# Patient Record
Sex: MALE | Race: BLACK OR AFRICAN AMERICAN | NOT HISPANIC OR LATINO | URBAN - METROPOLITAN AREA
[De-identification: names, ages, dates, MRNs, and addresses within clinical notes are randomized per-mention and may not be internally consistent; named-entity substitution may affect disease eponyms.]

---

## 2017-07-19 ENCOUNTER — EMERGENCY (EMERGENCY)
Facility: HOSPITAL | Age: 62
LOS: 0 days | Discharge: HOME | End: 2017-07-20
Attending: EMERGENCY MEDICINE
Payer: MEDICAID

## 2017-07-19 VITALS
OXYGEN SATURATION: 99 % | HEART RATE: 76 BPM | TEMPERATURE: 98 F | HEIGHT: 66 IN | SYSTOLIC BLOOD PRESSURE: 125 MMHG | RESPIRATION RATE: 18 BRPM | DIASTOLIC BLOOD PRESSURE: 88 MMHG | WEIGHT: 160.06 LBS

## 2017-07-19 PROCEDURE — 99284 EMERGENCY DEPT VISIT MOD MDM: CPT | Mod: 25

## 2017-07-19 RX ORDER — IPRATROPIUM/ALBUTEROL SULFATE 18-103MCG
3 AEROSOL WITH ADAPTER (GRAM) INHALATION ONCE
Refills: 0 | Status: COMPLETED | OUTPATIENT
Start: 2017-07-19 | End: 2017-07-19

## 2017-07-19 NOTE — ED PROVIDER NOTE - OBJECTIVE STATEMENT
Pertinent PMH/PSH/FHx/SHx and Review of Systems contained within:  Patient presents to the ED for cough.  Patient is nonverbal for MR, accompanied by staff member.  Patient has been having a cough for a few days, started on cipro 500 BID today for atypical pneumonia.  Sent in by resident nurse for CXR because "he was breathing funny."  Patient appears well, no respiratory distress.     Relevant PMHx/SHx/SOCHx/FAMH:  MR, VIt D deficiency, constipation  Residence is [place]

## 2017-07-19 NOTE — ED PROVIDER NOTE - MEDICAL DECISION MAKING DETAILS
Patient with cough, irregular breathing at home.  VSS. Labs and CXR reviewed.  Possible blunting of right cardiac silhoutte suggestive of pneumonia.  Patient well appearing and nonseptic.  Already on antibiotics, will have him continue, additional nebs and tessilon prescribed.  Recommend follow up with PMD to staff, can return to ER for any acute worsening, but for now appears too well for inpatient treatment of his cough.

## 2017-07-19 NOTE — ED ADULT NURSE NOTE - OBJECTIVE STATEMENT
Pt is a  alert 61 YOM who is here for a cough that has been going on for about a week and unproductive. Pt is coughing, but it is non-productive. Pt has been treated for this about a month ago and they diagnosed him bronchitis. That dissipated and the pt returned to normal. Pt is now here for the recurring cough.

## 2017-07-20 VITALS
OXYGEN SATURATION: 96 % | HEART RATE: 75 BPM | DIASTOLIC BLOOD PRESSURE: 66 MMHG | RESPIRATION RATE: 19 BRPM | TEMPERATURE: 98 F | SYSTOLIC BLOOD PRESSURE: 140 MMHG

## 2017-07-20 LAB
ALBUMIN SERPL ELPH-MCNC: 3 G/DL — LOW (ref 3.3–5)
ALP SERPL-CCNC: 82 U/L — SIGNIFICANT CHANGE UP (ref 40–120)
ALT FLD-CCNC: 27 U/L — SIGNIFICANT CHANGE UP (ref 12–78)
ANION GAP SERPL CALC-SCNC: 9 MMOL/L — SIGNIFICANT CHANGE UP (ref 5–17)
AST SERPL-CCNC: 38 U/L — HIGH (ref 15–37)
BASOPHILS # BLD AUTO: 0.1 K/UL — SIGNIFICANT CHANGE UP (ref 0–0.2)
BASOPHILS NFR BLD AUTO: 1.2 % — SIGNIFICANT CHANGE UP (ref 0–2)
BILIRUB SERPL-MCNC: 0.5 MG/DL — SIGNIFICANT CHANGE UP (ref 0.2–1.2)
BUN SERPL-MCNC: 14 MG/DL — SIGNIFICANT CHANGE UP (ref 7–23)
CALCIUM SERPL-MCNC: 8 MG/DL — LOW (ref 8.5–10.1)
CHLORIDE SERPL-SCNC: 106 MMOL/L — SIGNIFICANT CHANGE UP (ref 96–108)
CO2 SERPL-SCNC: 26 MMOL/L — SIGNIFICANT CHANGE UP (ref 22–31)
CREAT SERPL-MCNC: 1.06 MG/DL — SIGNIFICANT CHANGE UP (ref 0.5–1.3)
EOSINOPHIL # BLD AUTO: 0.4 K/UL — SIGNIFICANT CHANGE UP (ref 0–0.5)
EOSINOPHIL NFR BLD AUTO: 6.2 % — HIGH (ref 0–6)
GLUCOSE SERPL-MCNC: 103 MG/DL — HIGH (ref 70–99)
HCT VFR BLD CALC: 36.2 % — LOW (ref 39–50)
HGB BLD-MCNC: 12.7 G/DL — LOW (ref 13–17)
LYMPHOCYTES # BLD AUTO: 1.2 K/UL — SIGNIFICANT CHANGE UP (ref 1–3.3)
LYMPHOCYTES # BLD AUTO: 18.6 % — SIGNIFICANT CHANGE UP (ref 13–44)
MCHC RBC-ENTMCNC: 29 PG — SIGNIFICANT CHANGE UP (ref 27–34)
MCHC RBC-ENTMCNC: 35 GM/DL — SIGNIFICANT CHANGE UP (ref 32–36)
MCV RBC AUTO: 82.8 FL — SIGNIFICANT CHANGE UP (ref 80–100)
MONOCYTES # BLD AUTO: 0.9 K/UL — SIGNIFICANT CHANGE UP (ref 0–0.9)
MONOCYTES NFR BLD AUTO: 14.1 % — HIGH (ref 2–14)
NEUTROPHILS # BLD AUTO: 3.7 K/UL — SIGNIFICANT CHANGE UP (ref 1.8–7.4)
NEUTROPHILS NFR BLD AUTO: 59.9 % — SIGNIFICANT CHANGE UP (ref 43–77)
PLATELET # BLD AUTO: 130 K/UL — LOW (ref 150–400)
POTASSIUM SERPL-MCNC: 3.7 MMOL/L — SIGNIFICANT CHANGE UP (ref 3.5–5.3)
POTASSIUM SERPL-SCNC: 3.7 MMOL/L — SIGNIFICANT CHANGE UP (ref 3.5–5.3)
PROT SERPL-MCNC: 6.9 GM/DL — SIGNIFICANT CHANGE UP (ref 6–8.3)
RBC # BLD: 4.37 M/UL — SIGNIFICANT CHANGE UP (ref 4.2–5.8)
RBC # FLD: 13.6 % — SIGNIFICANT CHANGE UP (ref 11–15)
SODIUM SERPL-SCNC: 141 MMOL/L — SIGNIFICANT CHANGE UP (ref 135–145)
WBC # BLD: 6.2 K/UL — SIGNIFICANT CHANGE UP (ref 3.8–10.5)
WBC # FLD AUTO: 6.2 K/UL — SIGNIFICANT CHANGE UP (ref 3.8–10.5)

## 2017-07-20 PROCEDURE — 71020: CPT | Mod: 26

## 2017-07-20 RX ORDER — ALBUTEROL 90 UG/1
2 AEROSOL, METERED ORAL
Qty: 1 | Refills: 0
Start: 2017-07-20 | End: 2017-07-27

## 2017-07-20 RX ADMIN — Medication 3 MILLILITER(S): at 00:25

## 2017-07-20 RX ADMIN — Medication 3 MILLILITER(S): at 00:00

## 2017-07-21 DIAGNOSIS — R05 COUGH: ICD-10-CM

## 2017-07-21 DIAGNOSIS — Z79.2 LONG TERM (CURRENT) USE OF ANTIBIOTICS: ICD-10-CM

## 2018-04-05 ENCOUNTER — EMERGENCY (EMERGENCY)
Facility: HOSPITAL | Age: 63
LOS: 1 days | Discharge: HOME | End: 2018-04-05
Attending: EMERGENCY MEDICINE | Admitting: EMERGENCY MEDICINE
Payer: MEDICAID

## 2018-04-05 VITALS
HEART RATE: 77 BPM | SYSTOLIC BLOOD PRESSURE: 138 MMHG | OXYGEN SATURATION: 100 % | DIASTOLIC BLOOD PRESSURE: 69 MMHG | TEMPERATURE: 98 F | RESPIRATION RATE: 18 BRPM

## 2018-04-05 VITALS — TEMPERATURE: 98 F | OXYGEN SATURATION: 100 % | HEART RATE: 101 BPM | RESPIRATION RATE: 18 BRPM

## 2018-04-05 DIAGNOSIS — R69 ILLNESS, UNSPECIFIED: ICD-10-CM

## 2018-04-05 DIAGNOSIS — F20.3 UNDIFFERENTIATED SCHIZOPHRENIA: ICD-10-CM

## 2018-04-05 DIAGNOSIS — F84.0 AUTISTIC DISORDER: ICD-10-CM

## 2018-04-05 DIAGNOSIS — F73 PROFOUND INTELLECTUAL DISABILITIES: ICD-10-CM

## 2018-04-05 LAB
ALBUMIN SERPL ELPH-MCNC: 3.9 G/DL — SIGNIFICANT CHANGE UP (ref 3.3–5)
ALP SERPL-CCNC: 114 U/L — SIGNIFICANT CHANGE UP (ref 40–120)
ALT FLD-CCNC: 23 U/L — SIGNIFICANT CHANGE UP (ref 4–41)
APAP SERPL-MCNC: < 15 UG/ML — LOW (ref 15–25)
AST SERPL-CCNC: 44 U/L — HIGH (ref 4–40)
BASOPHILS # BLD AUTO: 0.02 K/UL — SIGNIFICANT CHANGE UP (ref 0–0.2)
BASOPHILS NFR BLD AUTO: 0.4 % — SIGNIFICANT CHANGE UP (ref 0–2)
BILIRUB SERPL-MCNC: 0.5 MG/DL — SIGNIFICANT CHANGE UP (ref 0.2–1.2)
BUN SERPL-MCNC: 15 MG/DL — SIGNIFICANT CHANGE UP (ref 7–23)
CALCIUM SERPL-MCNC: 8.8 MG/DL — SIGNIFICANT CHANGE UP (ref 8.4–10.5)
CHLORIDE SERPL-SCNC: 103 MMOL/L — SIGNIFICANT CHANGE UP (ref 98–107)
CO2 SERPL-SCNC: 27 MMOL/L — SIGNIFICANT CHANGE UP (ref 22–31)
CREAT SERPL-MCNC: 1.34 MG/DL — HIGH (ref 0.5–1.3)
EOSINOPHIL # BLD AUTO: 0.15 K/UL — SIGNIFICANT CHANGE UP (ref 0–0.5)
EOSINOPHIL NFR BLD AUTO: 3.1 % — SIGNIFICANT CHANGE UP (ref 0–6)
ETHANOL BLD-MCNC: < 10 MG/DL — SIGNIFICANT CHANGE UP
GLUCOSE SERPL-MCNC: 134 MG/DL — HIGH (ref 70–99)
HCT VFR BLD CALC: 42.8 % — SIGNIFICANT CHANGE UP (ref 39–50)
HGB BLD-MCNC: 14.2 G/DL — SIGNIFICANT CHANGE UP (ref 13–17)
IMM GRANULOCYTES # BLD AUTO: 0 # — SIGNIFICANT CHANGE UP
IMM GRANULOCYTES NFR BLD AUTO: 0 % — SIGNIFICANT CHANGE UP (ref 0–1.5)
LIDOCAIN IGE QN: 21.5 U/L — SIGNIFICANT CHANGE UP (ref 7–60)
LITHIUM SERPL-MCNC: < 0.04 MMOL/L — LOW (ref 0.6–1.2)
LYMPHOCYTES # BLD AUTO: 1.64 K/UL — SIGNIFICANT CHANGE UP (ref 1–3.3)
LYMPHOCYTES # BLD AUTO: 33.5 % — SIGNIFICANT CHANGE UP (ref 13–44)
MCHC RBC-ENTMCNC: 27.9 PG — SIGNIFICANT CHANGE UP (ref 27–34)
MCHC RBC-ENTMCNC: 33.2 % — SIGNIFICANT CHANGE UP (ref 32–36)
MCV RBC AUTO: 84.1 FL — SIGNIFICANT CHANGE UP (ref 80–100)
MONOCYTES # BLD AUTO: 0.66 K/UL — SIGNIFICANT CHANGE UP (ref 0–0.9)
MONOCYTES NFR BLD AUTO: 13.5 % — SIGNIFICANT CHANGE UP (ref 2–14)
NEUTROPHILS # BLD AUTO: 2.43 K/UL — SIGNIFICANT CHANGE UP (ref 1.8–7.4)
NEUTROPHILS NFR BLD AUTO: 49.5 % — SIGNIFICANT CHANGE UP (ref 43–77)
NRBC # FLD: 0 — SIGNIFICANT CHANGE UP
PLATELET # BLD AUTO: 166 K/UL — SIGNIFICANT CHANGE UP (ref 150–400)
PMV BLD: 12 FL — SIGNIFICANT CHANGE UP (ref 7–13)
POTASSIUM SERPL-MCNC: 4.3 MMOL/L — SIGNIFICANT CHANGE UP (ref 3.5–5.3)
POTASSIUM SERPL-SCNC: 4.3 MMOL/L — SIGNIFICANT CHANGE UP (ref 3.5–5.3)
PROT SERPL-MCNC: 7.5 G/DL — SIGNIFICANT CHANGE UP (ref 6–8.3)
RBC # BLD: 5.09 M/UL — SIGNIFICANT CHANGE UP (ref 4.2–5.8)
RBC # FLD: 13.6 % — SIGNIFICANT CHANGE UP (ref 10.3–14.5)
SALICYLATES SERPL-MCNC: < 5 MG/DL — LOW (ref 15–30)
SODIUM SERPL-SCNC: 140 MMOL/L — SIGNIFICANT CHANGE UP (ref 135–145)
TROPONIN T SERPL-MCNC: < 0.06 NG/ML — SIGNIFICANT CHANGE UP (ref 0–0.06)
TSH SERPL-MCNC: 4.25 UIU/ML — HIGH (ref 0.27–4.2)
WBC # BLD: 4.9 K/UL — SIGNIFICANT CHANGE UP (ref 3.8–10.5)
WBC # FLD AUTO: 4.9 K/UL — SIGNIFICANT CHANGE UP (ref 3.8–10.5)

## 2018-04-05 PROCEDURE — 90792 PSYCH DIAG EVAL W/MED SRVCS: CPT

## 2018-04-05 PROCEDURE — 99285 EMERGENCY DEPT VISIT HI MDM: CPT

## 2018-04-05 RX ORDER — HALOPERIDOL DECANOATE 100 MG/ML
5 INJECTION INTRAMUSCULAR ONCE
Qty: 0 | Refills: 0 | Status: COMPLETED | OUTPATIENT
Start: 2018-04-05 | End: 2018-04-05

## 2018-04-05 RX ORDER — CHLORPROMAZINE HCL 10 MG
50 TABLET ORAL ONCE
Qty: 0 | Refills: 0 | Status: COMPLETED | OUTPATIENT
Start: 2018-04-05 | End: 2018-04-05

## 2018-04-05 RX ADMIN — Medication 2 MILLIGRAM(S): at 11:52

## 2018-04-05 RX ADMIN — HALOPERIDOL DECANOATE 5 MILLIGRAM(S): 100 INJECTION INTRAMUSCULAR at 11:52

## 2018-04-05 RX ADMIN — HALOPERIDOL DECANOATE 5 MILLIGRAM(S): 100 INJECTION INTRAMUSCULAR at 11:20

## 2018-04-05 RX ADMIN — Medication 2 MILLIGRAM(S): at 21:04

## 2018-04-05 RX ADMIN — Medication 50 MILLIGRAM(S): at 17:04

## 2018-04-05 RX ADMIN — Medication 2 MILLIGRAM(S): at 11:20

## 2018-04-05 NOTE — ED ADULT TRIAGE NOTE - CHIEF COMPLAINT QUOTE
Pt was sent in from the day program for agitation and being aggressive. Pt in triage is yelling and screaming unable to get blood pressure EMS report pt hit his head in the wall

## 2018-04-05 NOTE — ED BEHAVIORAL HEALTH NOTE - BEHAVIORAL HEALTH NOTE
Worker spoke to Geetha who is staff at [facility] who states that she will be waiting in waiting room until staff at [facility] comes to switch shift. Worker called Ms. Molly ([phone] who states that staff at [facility] should be arriving in emergency room around 4:30pm and to call her on cell phone.

## 2018-04-05 NOTE — ED PROVIDER NOTE - OBJECTIVE STATEMENT
64M hx autism, MR, schizophrenia, baseline non-verbal, freq. agitated outbursts, last ~1 wk. ago. Pt. to ED accompanied by aide from day program who knows him well ("Nohelia") and today was more agitated than usual, staff unable to calm him down. Pt. began to throw chairs and have self-harm by hitting into walls. Police were called and pt. to ED agitated and in handcuffs. Pt. required huddle with psych attdg and chemical/physical restraints.

## 2018-04-05 NOTE — ED BEHAVIORAL HEALTH ASSESSMENT NOTE - SUMMARY
Patient is a 62 year old single unemployed non caregiver AAM currently residing in [place]. PPH Autistic Disorder, Undifferentiated Schizophrenia Conduct Disorder and Profound Intellectual Disability. He has no history of inpatient psychiatric admissions or known suicide attempts. He has a history of SIB,  agitation, property destruction, inappropriate social behavior, aggression/violence and is an elopement risk. Patient has a current behavior plan targeting behaviors and is on a 2:1. He has no known substance abuse problems or legal issues. PMH includes HLD. BIBA for agitation. Patient is a 62 year old single unemployed non caregiver AAM currently residing in [facility]. PPH Autistic Disorder, Undifferentiated Schizophrenia Conduct Disorder and Profound Intellectual Disability. He has no history of inpatient psychiatric admissions or known suicide attempts. He has a history of SIB,  agitation, property destruction, inappropriate social behavior, aggression/violence and is an elopement risk. Patient has a current behavior plan targeting behaviors and is on a 2:1. He has no known substance abuse problems or legal issues. PMH includes HLD. BIBA for agitation.    Patient presented to the ER in the context of agitation. Patient has a history of agitated/aggressive behavior likely related to poor frustration tolerance and limited ability to express his needs/wants verbally which contributes to agitated behavior/frustration. Patient does not appear acutely psychotic or manic. In the ER he was not engaging in violent behavior towards others or SIB.     For future management for this Patient, it is important to note that as per studies, "In people with significant developmental delays, agitated and aggressive behavior may be a means of expressing frustration, a learned problem behavior, an expression of physical pain or acute medical problem, a means of communication, or a signal of an acute psychiatric problem (Joni et al., manuscript in preparation; Jarvis, 2000; Mickie, Eunice, Galina, & Jackson, 1989; Prakash & Deidre, 1997; Martha & Paola, 1986). It is common for persons with mental retardation who have been stable and well adjusted to exhibit regression in situations of stress, pain, changes in routine, or novelty."    Patient's residence have no safety concerns and his behavior at day program is chronic. Patient has chronic risk factors but does not present an imminent risk to self or others meet criteria for involuntary inpatient admission. Extensive safety planning performed. Staff agreeing verbally to return patient to ER or call 911 if symptoms worsen or patient has urges to harm self or others. Patient will follow up tomorrow with his outpatient psychiatrist. Patient is a 62 year old single unemployed non caregiver AAM currently residing in [facility]. PPH Autistic Disorder, Undifferentiated Schizophrenia Conduct Disorder and Profound Intellectual Disability. He has no history of inpatient psychiatric admissions or known suicide attempts. He has a history of SIB,  agitation, property destruction, inappropriate social behavior, aggression/violence and is an elopement risk. Patient has a current behavior plan targeting behaviors and is on a 2:1 at day program where behaviors take place. He has no known substance abuse problems or legal issues. PMH includes HLD. BIBA for agitation.    Patient presented to the ER in the context of agitation. Patient has a history of agitated/aggressive behavior likely related to poor frustration tolerance and limited ability to express his needs/wants verbally which contributes to agitated behavior/frustration. Patient does not appear acutely psychotic or manic. In the ER he was not engaging in violent behavior towards others or self injurious. He was responsive to interventions and then calm throughout rest of stay.     For future management for this Patient, it is important to note that as per studies, "In people with significant developmental delays, agitated and aggressive behavior may be a means of expressing frustration, a learned problem behavior, an expression of physical pain or acute medical problem, a means of communication, or a signal of an acute psychiatric problem (Joni et al., manuscript in preparation; Conley, 2000; Mickie, Eunice, Galina, & Jackson, 1989; Prakash & Deidre, 1997; Martha & Paola, 1986). It is common for persons with mental retardation who have been stable and well adjusted to exhibit regression in situations of stress, pain, changes in routine, or novelty."    Patient's residence have no safety concerns and his behavior at day program is chronic. Patient has chronic risk factors but does not present an imminent risk to self or others meet criteria for involuntary inpatient admission. Extensive safety planning performed. Staff agreeing verbally to return patient to ER or call 911 if symptoms worsen or patient has urges to harm self or others. Patient will follow up tomorrow with his outpatient psychiatrist.    Informed Director Benita of medication received in ER with times, lithium level (recommended repeat with psychiatrist) and appointment time tomorrow. She stated she will call on call MD regarding if medication tonight should be held or given.

## 2018-04-05 NOTE — ED BEHAVIORAL HEALTH NOTE - BEHAVIORAL HEALTH NOTE
Writer contacted [employer] program to receive contact information for pt's residence at [phone]. Pt resides at [facility] and the phone number provided was [phone] with the contact individual being Benita Gregory. Writer later spoke with Ms. Gregory who denied any concerns for pt's behaviors at residence. She reported that pt has been fine at home and that he engages appropriately with staff. She also reported that pt presents as at baseline at residence and that no recent medication changes have been made. Ms. Gregory also reported that pt is scheduled to follow up with his psychiatrist, Dr. Sands, at the [facility] on the following day, 4/6/18. Time of the appointment was however unknown. Clinic number is [phone] and the address is [address] [place] [address].

## 2018-04-05 NOTE — ED BEHAVIORAL HEALTH NOTE - BEHAVIORAL HEALTH NOTE
Pt is a 64 year old, single male. PT was BIB EMS due to increased aggression. Pt was accompanied by Geetha Beard who is a "direct support staff personnel" at [facility] day [facility] #[phone]. Ms. Beard provided the following information:     Pt has a history of Autism and Schizophrenia. Pt is also nonverbal. Pt attends a day program 5 days a week between 8:30am and 3pm. Pt also resides at [facility] in [facility]. Pt also reported to have a history of behavioral concerns which have escalated over the past 2-3 weeks. Pt's behaviors on this day were also reported to have become unmanageable leading to ER visit. Pt reported to have been grunting in rage, and displaying self injurious behaviors by way of charging self in to wall. PT also reported to have been throwing chairs/tables and pulling/grabbing staff. Ms. Beard also reported that staff attempted to remove him from room, utilized "touch control", and encouraged use of coping skills, but that pt only became more agitated. She also reported possible sexual frustration as pt has a hx of inappropriate behaviors and often gleams at chest area of female staff members. She also reported that pt's episodes of agitation/aggression have been out of context with no known trigger and have increased in frequency.  Pt's rage like behavior was also reported to include grunting, squeezing body parts of staff or self, screaming, aggression, and increased sweating. Pt is a 64 year old, single male. PT was BIB EMS due to increased aggression. Pt was accompanied by Geetha Beard who is a "direct support staff personnel" at [facility] day [facility] #[phone]. Ms. Beard provided the following information:     Pt has a history of Autism and Schizophrenia. Pt is also nonverbal. Pt attends a day program 5 days a week between 8:30am and 3pm. Pt also resides at [facility] in [place]. Pt also reported to have a history of behavioral concerns which have escalated over the past 2-3 weeks. Pt's behaviors on this day were also reported to have become unmanageable leading to ER visit. Pt reported to have been grunting in rage, and displaying self injurious behaviors by way of charging self in to wall. PT also reported to have been throwing chairs/tables and pulling/grabbing staff. Ms. Beard also reported that staff attempted to remove him from room, utilized "touch control", and encouraged use of coping skills, but that pt only became more agitated. She also reported possible sexual frustration as pt has a hx of inappropriate behaviors and often gleams at chest area of female staff members. She also reported that pt's episodes of agitation/aggression have been out of context with no known trigger and have increased in frequency.  Pt's rage like behavior was also reported to include grunting, squeezing body parts of staff or self, screaming, elopement concerns, aggression, and increased sweating.

## 2018-04-05 NOTE — ED BEHAVIORAL HEALTH ASSESSMENT NOTE - OTHER
staff day program peers poor frustration tolerance, non verbal lives in 24/7 therapeutic residence patient is nonverbal; unable to participate in evaluation chronically limited Staff informed of medication patient received during ER stay and times Staff informed of medication patient received during ER stay and times and of lithium level. Informed regarding appt time tomorrow with psychiatrist /Staff informed of medication patient received during ER stay and times and of lithium level. Informed regarding appt time tomorrow with psychiatrist;  will reach out to MD regarding if medication tonight should be given or held.

## 2018-04-05 NOTE — ED BEHAVIORAL HEALTH ASSESSMENT NOTE - HPI (INCLUDE ILLNESS QUALITY, SEVERITY, DURATION, TIMING, CONTEXT, MODIFYING FACTORS, ASSOCIATED SIGNS AND SYMPTOMS)
Patient is a 62 year old single unemployed non caregiver AAM currently residing in [facility]. PPH Autistic Disorder, Undifferentiated Schizophrenia Conduct Disorder and Profound Intellectual Disability. He has no history of inpatient psychiatric admissions or known suicide attempts. He has a history of SIB,  agitation, aggression/violence and is an elopement risk. Patient has a current behavior plan targeting behaviors and is on a 2:1. He has no known substance abuse problems or legal issues. PMH includes HLD. BIBA for agitation.    Upon arrival patient was agitated. He was yelling, screaming and pacing; he was unable to be de-escalated and required IM Haldol 5mg & Ativan 2mg at 11:20. Patient remained agitated and was pacing, yelling and screaming. Various interventions were attempted by staff but he was unable to be de-escalated and required IM Haldol 5mg and Ativan 2mg IM at 11:50. Patient then was able to be calmed with redirection, staff support and providing him with a  bed lie down in with the lights off to decrease stimuli. He was not violent or SIB during stay. He later was calm and cooperative. He sat in the milieu and calmly at his lunch.     Patient was unable to be interviewed as he is non-verbal. He has a history of agitated/aggressive/SIB behavior. He was responsive to IM medication and therapeutic intervention. He remained calm throughout rest of stay. Patient is a 62 year old single unemployed non caregiver AAM currently residing in [facility]. PPH Autistic Disorder, Undifferentiated Schizophrenia Conduct Disorder and Profound Intellectual Disability. He has no history of inpatient psychiatric admissions or known suicide attempts. He has a history of SIB,  agitation, property destruction, inappropriate social behavior,  aggression/violence and is an elopement risk. Patient has a current behavior plan targeting behaviors and is on a 2:1. He has no known substance abuse problems or legal issues. PMH includes HLD. BIBA for agitation.    Upon arrival patient was agitated. He was yelling, screaming and pacing; he was unable to be de-escalated and required IM Haldol 5mg & Ativan 2mg at 11:20. Patient remained agitated and was pacing, yelling and screaming. Various interventions were attempted by staff but he was unable to be de-escalated and required IM Haldol 5mg and Ativan 2mg IM at 11:50. Patient then was able to be calmed with redirection, staff support and providing him with a  bed lie down in with the lights off to decrease stimuli. He was not violent or SIB during stay. He later was calm and cooperative. He sat in the milieu and calmly at his lunch.     Patient was unable to be interviewed as he is non-verbal. He has a history of agitated/aggressive/SIB behavior. He was responsive to IM medication and therapeutic intervention.     See  note for collateral information. Patient is a 62 year old single unemployed non caregiver AAM currently residing in [facility]. PPH Autistic Disorder, Undifferentiated Schizophrenia Conduct Disorder and Profound Intellectual Disability. He has no history of inpatient psychiatric admissions or known suicide attempts. He has a history of SIB,  agitation, property destruction, inappropriate social behavior,  aggression/violence and is an elopement risk. Patient has a current behavior plan targeting behaviors and is on a 2:1. He has no known substance abuse problems or legal issues. PMH includes HLD. BIBA for agitation.    Upon arrival patient was agitated. He was yelling, screaming and pacing; he was unable to be de-escalated and required IM Haldol 5mg & Ativan 2mg at 11:20. Patient remained agitated and was pacing, yelling and screaming. Various interventions were attempted by staff but he was unable to be de-escalated and required IM Haldol 5mg and Ativan 2mg IM at 11:50. Patient then was able to be calmed with redirection, staff support and providing him with a  bed lie down in with the lights off to decrease stimuli. He was not violent or SIB during stay. He later was calm and cooperative. He sat in the milieu and calmly at his lunch.  Patient suddenly became agitated again and threw the trash can. He was yelling and screaming unable to be de-escalated. He was not violent. He required IM medication Thorazine 50mg at 15:30.    Patient was unable to be interviewed as he is non-verbal. He has a history of agitated/aggressive/SIB behavior. He was responsive to IM medication and therapeutic intervention.     See  note for collateral information. Patient is a 62 year old single unemployed non caregiver AAM currently residing in [facility]. PPH Autistic Disorder, Undifferentiated Schizophrenia Conduct Disorder and Profound Intellectual Disability. He has no history of inpatient psychiatric admissions or known suicide attempts. He has a history of SIB,  agitation, property destruction, inappropriate social behavior,  aggression/violence and is an elopement risk. Patient has a current behavior plan targeting behaviors and is on a 2:1 in day program. He has no known substance abuse problems or legal issues. PMH includes HLD. BIBA for agitation.    Upon arrival patient was agitated. He was yelling, screaming and pacing; he was unable to be de-escalated and required IM Haldol 5mg & Ativan 2mg at 11:20 by Dr. Velasquez. Patient remained agitated and was pacing, attempting to open doors/banging on the window, threw over garbage can, yelling and screaming. Various interventions were attempted by staff but he was unable to be de-escalated and required IM Haldol 5mg and Ativan 2mg IM at 11:50 by Dr. Isaacs. Patient then was able to be calmed with redirection, staff support and providing him with a  bed lie down in with the lights off to decrease stimuli. He was not violent or self injurious during stay. He later was calm and cooperative. He sat in the milieu and calmly at his lunch. Patient became agitated again and threw the trash can. He was yelling and screaming unable to be de-escalated. He was not violent. He required IM medication Thorazine 50mg at 15:30. He was responsive and calm throughout rest of stay.     Patient was unable to be interviewed as he is non-verbal. He has a history of agitated/aggressive/SIB behavior. He was responsive to IM medication and therapeutic intervention.     See  note for collateral information.

## 2018-04-05 NOTE — ED BEHAVIORAL HEALTH ASSESSMENT NOTE - INVOLUNTARY INTRAMUSCULAR MEDICATION DETAILS
Haldol 5mg & Ativan 2mg at 11:20, Haldol 5mg and Ativan 2mg IM at 11:50 by Dr. Isaacs Haldol 5mg & Ativan 2mg at 11:20, Haldol 5mg and Ativan 2mg IM at 11:50 by Dr. Isaacs, Thorazine 50mg at 15:30.

## 2018-04-05 NOTE — ED ADULT NURSE REASSESSMENT NOTE - NS ED NURSE REASSESS COMMENT FT1
Medication given as ordered, pt in improved and stable condition, discharged as per MD Zazueta order, discharge instructions given to staff, pt left ER back to residence via EMS, safety maintained.

## 2018-04-05 NOTE — ED BEHAVIORAL HEALTH ASSESSMENT NOTE - SAFETY PLAN DETAILS
Extensive safety planning performed. Staff agreeing verbally to return patient to ER or call 911 if symptoms worsen or patient has urges to harm self or others.

## 2018-04-05 NOTE — ED BEHAVIORAL HEALTH NOTE - BEHAVIORAL HEALTH NOTE
Worker spoke to Wiley Fitzpatrick ([phone]) in waiting room, who is staff at [facility] who states he will accompany patient back to residence in ambulance.

## 2018-04-05 NOTE — ED BEHAVIORAL HEALTH ASSESSMENT NOTE - OTHER PAST PSYCHIATRIC HISTORY (INCLUDE DETAILS REGARDING ONSET, COURSE OF ILLNESS, INPATIENT/OUTPATIENT TREATMENT)
PPH Autistic Disorder, Undifferentiated Schizophrenia Conduct Disorder and Profound Intellectual Disability. He has no history of inpatient psychiatric admissions or known suicide attempts. Psychiatrist, Dr. Sands, at the [facility]

## 2018-04-05 NOTE — ED BEHAVIORAL HEALTH ASSESSMENT NOTE - CURRENT MEDICATION
Lithium 600mg QAM; 200mg QHS, Zyprexa 10mg QAM, Klonopin 1.5mg QHS, Gait Belt, Zyprexa 5mg PRN; Ativan 2mg PRN

## 2018-04-05 NOTE — ED BEHAVIORAL HEALTH ASSESSMENT NOTE - DETAILS
patient non-verbal; history of SIB see hpi/summary/BH note patient is non verbal residence aware; will inform day program

## 2018-04-05 NOTE — ED BEHAVIORAL HEALTH NOTE - BEHAVIORAL HEALTH NOTE
Worker spoke to Benita from [facility] ([phone]) who states that a staff member will be coming to the hospital to assist patient upon upcoming discharge.

## 2018-04-05 NOTE — ED BEHAVIORAL HEALTH ASSESSMENT NOTE - DESCRIPTION
Upon arrival patient was agitated. He was yelling, screaming and pacing; he was unable to be de-escalated and required IM Haldol 5mg & Ativan 2mg at 11:20 by Dr. Velasquez. Patient remained agitated and was pacing, attempting to open doors/banging on the window, yelling and screaming. Various interventions were attempted by staff but he was unable to be de-escalated and required IM Haldol 5mg and Ativan 2mg IM at 11:50 by Dr. Isaacs. Patient then was able to be calmed with redirection, staff support and providing him with a  bed lie down in with the lights off to decrease stimuli. He was not violent or SIB during stay. He later was calm and cooperative. He sat in the milieu and calmly at his lunch. Upon arrival patient was agitated. He was yelling, screaming and pacing; he was unable to be de-escalated and required IM Haldol 5mg & Ativan 2mg at 11:20 by Dr. Velasquez. Patient remained agitated and was pacing, attempting to open doors/banging on the window, threw over garbage can, yelling and screaming. Various interventions were attempted by staff but he was unable to be de-escalated and required IM Haldol 5mg and Ativan 2mg IM at 11:50 by Dr. Isaacs. Patient then was able to be calmed with redirection, staff support and providing him with a  bed lie down in with the lights off to decrease stimuli. He was not violent or SIB during stay. He later was calm and cooperative. He sat in the milieu and calmly at his lunch. Patient suddenly became agitated again and threw the trash can. He was yelling and screaming unable to be de-escalated. He was not violent. He required IM medication Thorazine 50mg at 15:30. OSEAS see hpi Upon arrival patient was agitated. He was yelling, screaming and pacing; he was unable to be de-escalated and required IM Haldol 5mg & Ativan 2mg at 11:20 by Dr. Velasquez. Patient remained agitated and was pacing, attempting to open doors/banging on the window, threw over garbage can, yelling and screaming. Various interventions were attempted by staff but he was unable to be de-escalated and required IM Haldol 5mg and Ativan 2mg IM at 11:50 by Dr. Isaacs. Patient then was able to be calmed with redirection, staff support and providing him with a  bed lie down in with the lights off to decrease stimuli. He was not violent or self injurious during stay. He later was calm and cooperative. He sat in the milieu and calmly at his lunch. Patient suddenly became agitated again and threw the trash can. He was yelling and screaming unable to be de-escalated. He was not violent. He required IM medication Thorazine 50mg at 15:30. He was responsive and calm throughout rest of stay.

## 2018-04-05 NOTE — ED BEHAVIORAL HEALTH NOTE - BEHAVIORAL HEALTH NOTE
Spoke with  Benita Gregory [phone]- She reports that patient has no history of inpatient psychiatric admissions or no known suicide attempts. He has a history of aggression/violence and SIB but he has never displayed that at the home. He has a chronic history of elopement and wears a special belt. She reports the patient's behavioral plan is more related to when he is at dayb because she is aware he has a history of aggression/agitation there but she has never witnessed it. Patient has been fine. She has no safety concerns. Patient has an appointment tomorrow with his psychiatrist.     Called Dr. Sands at [facility]- Dr. Sands is not in today. Patient has an appt tomorrow at 12:30 with Dr. Sands. Left message regarding patient's ER visit, course of hospitalization and discharge. Spoke with  Benita Gregory [phone]- She reports that patient has no history of inpatient psychiatric admissions or no known suicide attempts. He has a history of aggression/violence and SIB but he has never displayed that at the home. He has a chronic history of elopement and wears a special belt. She reports the patient's behavioral plan and episodes of agitation is more related to when he is at [facility] but she has never witnessed it at the residence. Patient has been fine. She suspects patient may have lost his yellow ball at program or been upset about something and could not express himself. She has no safety concerns. Patient has an appointment tomorrow with his psychiatrist.     Called Dr. Sands at [facility]- Dr. Sands is not in today. Patient has an appt tomorrow at 12:30 with Dr. Sands. Left message regarding patient's ER visit, course of hospitalization and discharge. Spoke with  Benita Gregory [phone]- She reports that patient has no history of inpatient psychiatric admissions or no known suicide attempts. He has a history of aggression/violence and SIB but he has never displayed that at the home. He has a chronic history of elopement and wears a special belt. She reports the patient'sepisodes of agitation/aggression is chronic and only happens when he is at dayhab and she has never witnessed it at the residence. Patient has been fine. She suspects patient may have lost his yellow ball at program or been upset about something and could not express himself. She has no safety concerns. Patient has an appointment tomorrow with his psychiatrist.     Called Dr. Sands at [facility]- Dr. Sands is not in today. Patient has an appt tomorrow at 12:30 with Dr. Sands. Left message regarding patient's ER visit, course of hospitalization and discharge. Spoke with  Benita Gregory [phone]- She reports that patient has no history of inpatient psychiatric admissions or no known suicide attempts. He has a history of aggression/violence and SIB but he has never displayed that at the home. He has a chronic history of elopement and wears a special belt. She reports the patient'sepisodes of agitation/aggression is chronic and only happens when he is at dayb and she has never witnessed it at the residence. Patient has been fine. She suspects patient may have lost his yellow ball at program or been upset about something and could not express himself. She has no safety concerns. Patient has an appointment tomorrow with his psychiatrist. He is medication compliant.     Called Dr. Sands at [facility]- Dr. Sands is not in today. Patient has an appt tomorrow at 12:30 with Dr. Sands. Left message regarding patient's ER visit, course of hospitalization and discharge. Spoke with  Benita Gregory [phone]- She reports that patient has no history of inpatient psychiatric admissions or no known suicide attempts. He has a history of aggression/violence and SIB but he has never displayed that at the home. He has a chronic history of elopement and wears a special belt. She reports the patient'sepisodes of agitation/aggression is chronic and only happens when he is at dayb and she has never witnessed it at the residence. Patient has been fine. She suspects patient may have lost his yellow ball at program or been upset about something and could not express himself. She has no safety concerns, patient does not need inpatient admission. Patient has an appointment tomorrow with his psychiatrist. He is medication compliant.     Called Dr. Sands at [facility]- Dr. Sands is not in today. Patient has an appt tomorrow at 12:30 with Dr. Sands. Left message regarding patient's ER visit, course of hospitalization and discharge.

## 2018-04-05 NOTE — ED BEHAVIORAL HEALTH ASSESSMENT NOTE - CASE SUMMARY
Patient is a 62 year old single unemployed non caregiver AAM currently residing in [facility]. PPH Autistic Disorder, Undifferentiated Schizophrenia Conduct Disorder and Profound Intellectual Disability. He has no history of inpatient psychiatric admissions or known suicide attempts. He has a history of SIB,  agitation, property destruction, inappropriate social behavior, aggression/violence and is an elopement risk. Patient has a current behavior plan targeting behaviors and is on a 2:1 at day program where behaviors take place. He has no known substance abuse problems or legal issues. PMH includes HLD. BIBA for agitation.    Patient presented to the ER in the context of agitation. Patient has a history of agitated/aggressive behavior likely related to poor frustration tolerance and limited ability to express his needs/wants verbally which contributes to agitated behavior/frustration. Patient does not appear acutely psychotic or manic. In the ER he was not engaging in violent behavior towards others or self injurious. He was responsive to interventions and then calm throughout rest of stay.   Pt does not present an acute danger to self or others and does not require inpatient psychiatric hospitalization at this time.

## 2018-04-05 NOTE — ED BEHAVIORAL HEALTH ASSESSMENT NOTE - RISK ASSESSMENT
Protective factors- No history of suicide attempts, lives in supportive 24/7 [facility] residence, outpatient treaters, medication compliance, no history of inpatient admissions, per residence at baseline, Patient has a current behavior plan targeting behaviors and is on a 2:1     Chronic Risk Factors- Protective factors- No history of suicide attempts, lives in supportive 24/7 supervised residence, outpatient treaters, medication compliance, no history of inpatient admissions, per residence at baseline, Patient has a current behavior plan targeting behaviors and is on a 2:1     Chronic Risk Factors-history of SIB,  agitation, property destruction, inappropriate social behavior, aggression/violence and is an elopement risk, intellectual disability/non-verbal, poor frustration tolerance Protective factors- No history of suicide attempts, lives in supportive 24/7 supervised residence, outpatient treaters, medication compliance, no history of inpatient admissions, per residence at baseline, Patient has a current behavior plan targeting behaviors and is on a 2:1 at day program where behaviors take place.     Chronic Risk Factors-history of SIB,  agitation, property destruction, inappropriate social behavior, aggression/violence and is an elopement risk, intellectual disability/non-verbal, poor frustration tolerance

## 2018-04-13 ENCOUNTER — EMERGENCY (EMERGENCY)
Facility: HOSPITAL | Age: 63
LOS: 0 days | Discharge: HOME | End: 2018-04-14
Attending: STUDENT IN AN ORGANIZED HEALTH CARE EDUCATION/TRAINING PROGRAM
Payer: COMMERCIAL

## 2018-04-13 VITALS
RESPIRATION RATE: 16 BRPM | TEMPERATURE: 98 F | SYSTOLIC BLOOD PRESSURE: 136 MMHG | OXYGEN SATURATION: 100 % | DIASTOLIC BLOOD PRESSURE: 68 MMHG | HEART RATE: 63 BPM

## 2018-04-13 DIAGNOSIS — Z04.1 ENCOUNTER FOR EXAMINATION AND OBSERVATION FOLLOWING TRANSPORT ACCIDENT: ICD-10-CM

## 2018-04-13 DIAGNOSIS — T14.8XXA OTHER INJURY OF UNSPECIFIED BODY REGION, INITIAL ENCOUNTER: ICD-10-CM

## 2018-04-13 DIAGNOSIS — F79 UNSPECIFIED INTELLECTUAL DISABILITIES: ICD-10-CM

## 2018-04-13 DIAGNOSIS — Z79.2 LONG TERM (CURRENT) USE OF ANTIBIOTICS: ICD-10-CM
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
## 2018-04-13 PROCEDURE — 99283 EMERGENCY DEPT VISIT LOW MDM: CPT | Mod: 25

## 2018-04-13 PROCEDURE — 99053 MED SERV 10PM-8AM 24 HR FAC: CPT
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
## 2018-04-23 NOTE — ED BEHAVIORAL HEALTH ASSESSMENT NOTE - AXIS III
"Oncology Rooming Note    April 23, 2018 3:17 PM   Francisco Javier Cortes is a 80 year old male who presents for:    Chief Complaint   Patient presents with     Oncology Clinic Visit     1 week follow up     Initial Vitals: /72  Pulse 96  Temp 97.8  F (36.6  C)  Ht 1.651 m (5' 5\")  Wt 72.6 kg (160 lb)  SpO2 97%  BMI 26.63 kg/m2 Estimated body mass index is 26.63 kg/(m^2) as calculated from the following:    Height as of this encounter: 1.651 m (5' 5\").    Weight as of this encounter: 72.6 kg (160 lb). Body surface area is 1.82 meters squared.  Moderate Pain (5) Comment: back and legs -    No LMP for male patient.  Allergies reviewed: Yes  Medications reviewed: Yes    Medications: Medication refills not needed today.  Pharmacy name entered into [employer]:    [facility] [place] - [place], [place] - [address], [address]  [facility] [place]  [address] [id number] - [place], [place] - [address] [facility] [place] [facility]        5 minutes for nursing intake (face to face time)     Kathleen Sandoval LPN              " OSEAS

## 2018-04-24 ENCOUNTER — EMERGENCY (EMERGENCY)
Facility: HOSPITAL | Age: 63
LOS: 1 days | Discharge: HOME | End: 2018-04-24
Attending: EMERGENCY MEDICINE | Admitting: EMERGENCY MEDICINE
Payer: MEDICAID

## 2018-04-24 VITALS
RESPIRATION RATE: 16 BRPM | DIASTOLIC BLOOD PRESSURE: 83 MMHG | HEART RATE: 71 BPM | SYSTOLIC BLOOD PRESSURE: 136 MMHG | OXYGEN SATURATION: 100 %

## 2018-04-24 VITALS
DIASTOLIC BLOOD PRESSURE: 62 MMHG | SYSTOLIC BLOOD PRESSURE: 98 MMHG | OXYGEN SATURATION: 96 % | RESPIRATION RATE: 18 BRPM | TEMPERATURE: 97 F | HEART RATE: 66 BPM

## 2018-04-24 DIAGNOSIS — F73 PROFOUND INTELLECTUAL DISABILITIES: ICD-10-CM

## 2018-04-24 PROCEDURE — 90792 PSYCH DIAG EVAL W/MED SRVCS: CPT

## 2018-04-24 PROCEDURE — 99285 EMERGENCY DEPT VISIT HI MDM: CPT

## 2018-04-24 RX ORDER — CHLORPROMAZINE HCL 10 MG
50 TABLET ORAL ONCE
Qty: 0 | Refills: 0 | Status: COMPLETED | OUTPATIENT
Start: 2018-04-24 | End: 2018-04-24

## 2018-04-24 RX ORDER — HALOPERIDOL DECANOATE 100 MG/ML
5 INJECTION INTRAMUSCULAR ONCE
Qty: 0 | Refills: 0 | Status: COMPLETED | OUTPATIENT
Start: 2018-04-24 | End: 2018-04-24

## 2018-04-24 RX ADMIN — HALOPERIDOL DECANOATE 5 MILLIGRAM(S): 100 INJECTION INTRAMUSCULAR at 13:05

## 2018-04-24 RX ADMIN — Medication 2 MILLIGRAM(S): at 13:05

## 2018-04-24 RX ADMIN — Medication 50 MILLIGRAM(S): at 13:19

## 2018-04-24 NOTE — ED BEHAVIORAL HEALTH ASSESSMENT NOTE - DETAILS
patient non-verbal; history of SIB residence and program contacted. see hpi/summary/BH note patient is non verbal

## 2018-04-24 NOTE — ED ADULT NURSE NOTE - OBJECTIVE STATEMENT
pt received in  c/o agitation, pt is mute, unable to assess pt for SI or HI. pt remained agitated, screaming uncontrollably, requriing medication for safety and stabilization (see EMR). Safety and comfort measures maintained.

## 2018-04-24 NOTE — ED BEHAVIORAL HEALTH ASSESSMENT NOTE - DESCRIPTION
Pt became agitated when staff tried to take away his tennis ball and required Haldol 5mg and Ativan 2mg IM STAT x1 with good effect.     Vital Signs Last 24 Hrs  T(C): --  T(F): --  HR: 71 (24 Apr 2018 11:50) (71 - 71)  BP: 136/83 (24 Apr 2018 11:50) (136/83 - 136/83)  BP(mean): --  RR: 16 (24 Apr 2018 11:50) (16 - 16)  SpO2: 100% (24 Apr 2018 11:50) (100% - 100%) OSEAS see hpi

## 2018-04-24 NOTE — ED BEHAVIORAL HEALTH ASSESSMENT NOTE - HPI (INCLUDE ILLNESS QUALITY, SEVERITY, DURATION, TIMING, CONTEXT, MODIFYING FACTORS, ASSOCIATED SIGNS AND SYMPTOMS)
Patient is a 62 year old single unemployed non caregiver AAM currently residing in [facility]. PPH Autistic Disorder, Undifferentiated Schizophrenia Conduct Disorder and Profound Intellectual Disability. He has no history of inpatient psychiatric admissions or known suicide attempts. He has a history of SIB,  agitation, property destruction, inappropriate social behavior,  aggression/violence and is an elopement risk. Patient has a current behavior plan targeting behaviors and is on a 2:1 in day program. He has no known substance abuse problems or legal issues. PMH includes HLD. BIBA for agitation (reportedly threw chairs at his program.) Of note, Pt was seen in our ED on 4/5/18 for similar reasons (agitation.)     Upon approach, Pt is holding a tennis ball and not willing to give it up. When staff tried to take it away from him, he became agitated and required PRN (Haldol 5mg and Ativan 2mg IM STAT x1.) Pt is nonverbal and unable to engage in interview but not aggressive or agitated after PRN and allowed staff to take away his tennis ball. Collateral information was obtained from his residence as well as his day program by BEL. See  note.

## 2018-04-24 NOTE — ED BEHAVIORAL HEALTH ASSESSMENT NOTE - SUMMARY
Patient is a 62 year old single unemployed non caregiver AAM currently residing in [facility]. PPH Autistic Disorder, Undifferentiated Schizophrenia Conduct Disorder and Profound Intellectual Disability. He has no history of inpatient psychiatric admissions or known suicide attempts. He has a history of SIB,  agitation, property destruction, inappropriate social behavior,  aggression/violence and is an elopement risk. Patient has a current behavior plan targeting behaviors and is on a 2:1 in day program. He has no known substance abuse problems or legal issues. PMH includes HLD. BIBA for agitation (reportedly threw chairs at his program.) Of note, Pt was seen in our ED on 4/5/18 for similar reasons (agitation.)     Upon approach, Pt is holding a tennis ball and not willing to give it up. When staff tried to take it away from him, he became agitated and required PRN (Haldol 5mg and Ativan 2mg IM STAT x1.) Pt is nonverbal and unable to engage in interview but not aggressive or agitated after PRN and allowed staff to take away his tennis ball. Collateral information was obtained from his residence as well as his day program by BEL. See  note.

## 2018-04-24 NOTE — ED BEHAVIORAL HEALTH ASSESSMENT NOTE - RISK ASSESSMENT
Protective factors- No history of suicide attempts, lives in supportive 24/7 supervised residence, outpatient treaters, medication compliance, no history of inpatient admissions, per residence at baseline, Patient has a current behavior plan targeting behaviors and is on a 2:1 at day program where behaviors take place.     Chronic Risk Factors-history of SIB,  agitation, property destruction, inappropriate social behavior, aggression/violence and is an elopement risk, intellectual disability/non-verbal, poor frustration tolerance

## 2018-04-24 NOTE — ED BEHAVIORAL HEALTH NOTE - BEHAVIORAL HEALTH NOTE
Patient is a 62 year old male sent to the emergency room from his program.  Writer called  from EMS run sheet, and left a voicemail requesting a callback to social work [employer].   Writer called Nora Hanson  x [phone] but was a non-working extension.  Writer called and dialed O, was transferred to [place] in Medical.  She stated writer was calling the medical foster department, but needed the OPWDD department.  She states writer should call  [phone] or [phone] for the  to the OPWDD department.  Writer left a voicemail at x [phone] requesting a callback to social work [employer].  Writer called again to x [phone] and left a voicemail requestin    Writer contacted a number in paperwork  writer spoke to Sue at [facility], she states this is patient’s PCP office, but they did not send patient therefore have no information as to why patient is in the ED.  She provided two contact numbers that PCP office has for possible collateral [phone].    Writer called  medical provider coordinator at patient’s residence Cassi Ulloa.  She states the behaviors that patient displays at program are not typical behaviors and not behaviors that are displayed at home.  The behaviors have been present two to three months.  She does not have any safety concerns.  Patient is compliant with all his medications.  She states he self- soothes with a tennis ball or by barking sometimes and is not a management problem.  She confirmed patient’s home address as : [address] [place] [address].  She did not have phone number for day program and asked writer to call back later.  Writer received a call from Glenna Sinha  at [facility] who states she does not have a contact number for patient’s day program, but will call someone who may have that information and have them contact writer.    Writer called [phone] [facility] .  She states he came in to the program “having behaviors”.  She states his behaviors are barking and yelling.  She states patient likes quiet places, but there are 52 individuals in the program so there are always people in the rooms.  Today he started acting up during morning meeting where there were about 30 people in the room.  When staff tried to calm him down he started banging his body against the walls.  He threw a chair and everyone left the room.  He calmed down for a few minutes then acted out again and pushed himself into a wall again.  Patient is on one staff to two individuals typically.  Patient at the group home is one of 4 individuals and she doesn’t know if patient is in the correct setting as it may be too stimulating.  She is going to have a team meeting with their behavior specialist to discuss appropriate treatment options.

## 2018-04-24 NOTE — ED BEHAVIORAL HEALTH ASSESSMENT NOTE - OTHER PAST PSYCHIATRIC HISTORY (INCLUDE DETAILS REGARDING ONSET, COURSE OF ILLNESS, INPATIENT/OUTPATIENT TREATMENT)
PPH Autistic Disorder, Undifferentiated Schizophrenia Conduct Disorder and Profound Intellectual Disability. He has no history of inpatient psychiatric admissions or known suicide attempts. Psychiatrist, Dr. Sands, at the [facility]

## 2018-04-24 NOTE — ED PROVIDER NOTE - OBJECTIVE STATEMENT
62 M with hx of autism, mute at baseline, schizophrenia, brought from group home for increased agitation today and aggression.  Patient per staff was throwing chairs, aggressive, and brought to ER by EMS and PD.  Patient mute at baseline, cannot give further hx.
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
## 2018-04-24 NOTE — ED BEHAVIORAL HEALTH ASSESSMENT NOTE - OTHER
patient is nonverbal; unable to participate in evaluation chronically limited poor frustration tolerance, non verbal lives in 24/7 therapeutic residence staff day program peers

## 2018-05-29 ENCOUNTER — EMERGENCY (EMERGENCY)
Facility: HOSPITAL | Age: 63
LOS: 1 days | Discharge: HOME | End: 2018-05-29
Attending: EMERGENCY MEDICINE | Admitting: EMERGENCY MEDICINE
Payer: MEDICAID

## 2018-05-29 VITALS
SYSTOLIC BLOOD PRESSURE: 91 MMHG | OXYGEN SATURATION: 97 % | HEART RATE: 68 BPM | RESPIRATION RATE: 18 BRPM | DIASTOLIC BLOOD PRESSURE: 55 MMHG

## 2018-05-29 PROCEDURE — 99285 EMERGENCY DEPT VISIT HI MDM: CPT

## 2018-05-29 PROCEDURE — 90792 PSYCH DIAG EVAL W/MED SRVCS: CPT

## 2018-05-29 RX ORDER — HALOPERIDOL DECANOATE 100 MG/ML
5 INJECTION INTRAMUSCULAR ONCE
Qty: 0 | Refills: 0 | Status: COMPLETED | OUTPATIENT
Start: 2018-05-29 | End: 2018-05-29

## 2018-05-29 RX ORDER — CHLORPROMAZINE HCL 10 MG
100 TABLET ORAL ONCE
Qty: 0 | Refills: 0 | Status: COMPLETED | OUTPATIENT
Start: 2018-05-29 | End: 2018-05-29

## 2018-05-29 RX ORDER — CHLORPROMAZINE HCL 10 MG
50 TABLET ORAL ONCE
Qty: 0 | Refills: 0 | Status: COMPLETED | OUTPATIENT
Start: 2018-05-29 | End: 2018-05-29

## 2018-05-29 RX ADMIN — Medication 50 MILLIGRAM(S): at 10:47

## 2018-05-29 RX ADMIN — Medication 2 MILLIGRAM(S): at 10:47

## 2018-05-29 RX ADMIN — HALOPERIDOL DECANOATE 5 MILLIGRAM(S): 100 INJECTION INTRAMUSCULAR at 10:21

## 2018-05-29 RX ADMIN — Medication 2 MILLIGRAM(S): at 10:21

## 2018-05-29 RX ADMIN — Medication 100 MILLIGRAM(S): at 13:50

## 2018-05-29 NOTE — ED BEHAVIORAL HEALTH ASSESSMENT NOTE - CASE SUMMARY
Patient is a 62 year old single unemployed non caregiver AAM currently residing in [facility]. PPH Autistic Disorder, Undifferentiated Schizophrenia Conduct Disorder and Profound Intellectual Disability. He has no history of inpatient psychiatric admissions or known suicide attempts. He has a history of SIB,  agitation, property destruction, inappropriate social behavior, aggression/violence and is an elopement risk. Patient has a current behavior plan targeting behaviors and is on a 2:1 at day program where behaviors take place. He has no known substance abuse problems or legal issues. PMH includes HLD. BIBA for agitation.    Patient presented to the ER in the context of agitation. Patient has a history of agitated/aggressive behavior likely related to poor frustration tolerance and limited ability to express his needs/wants verbally which contributes to agitated behavior/frustration. Patient does not appear acutely psychotic or manic. In the ER he was not engaging in violent behavior towards others or self injurious. He was responsive to interventions and then calm throughout rest of stay.   Pt does not present an acute danger to self or others and does not require inpatient psychiatric hospitalization at this time.

## 2018-05-29 NOTE — ED BEHAVIORAL HEALTH ASSESSMENT NOTE - CURRENT MEDICATION
Lithium 600mg QAM; 200mg QHS, Zyprexa 10mg QAM, Klonopin 1.5mg QHS, Gait Belt, Zyprexa 5mg PRN; Ativan 2mg PRN Lithium 600mg QAM; Zyprexa 10mg QAM, Klonopin 1.5mg QHS, Zyprexa 5mg PRN; Ativan 2mg PRN

## 2018-05-29 NOTE — ED BEHAVIORAL HEALTH ASSESSMENT NOTE - DESCRIPTION
Upon arrival patient was agitated. He was yelling, screaming and pacing; he was unable to be de-escalated and required IM Haldol 5mg & Ativan 2mg at 11:20 by Dr. Velasquez. Patient remained agitated and was pacing, attempting to open doors/banging on the window, threw over garbage can, yelling and screaming. Various interventions were attempted by staff but he was unable to be de-escalated and required IM Haldol 5mg and Ativan 2mg IM at 11:50 by Dr. Isaacs. Patient then was able to be calmed with redirection, staff support and providing him with a  bed lie down in with the lights off to decrease stimuli. He was not violent or self injurious during stay. He later was calm and cooperative. He sat in the milieu and calmly at his lunch. Patient suddenly became agitated again and threw the trash can. He was yelling and screaming unable to be de-escalated. He was not violent. He required IM medication Thorazine 50mg at 15:30. He was responsive and calm throughout rest of stay.    Vital Signs Last 24 Hrs  T(C): --  T(F): --  HR: 68 (29 May 2018 11:51) (68 - 68)  BP: 91/55 (29 May 2018 11:51) (91/55 - 91/55)  BP(mean): --  RR: 18 (29 May 2018 11:51) (18 - 18)  SpO2: 97% (29 May 2018 11:51) (97% - 97%) OSEAS see hpi Upon arrival patient was agitated. He was yelling, screaming and pacing; he was unable to be de-escalated and required IM Haldol 5mg & Ativan 2mg at 10:16 by Dr. Velasquez. Patient remained agitated and was yelling and screaming. Various interventions were attempted by staff but he was unable to be de-escalated and required IM Thorazine 50mg and Ativan 2mg IM at 10:40.  Patient then was able to be calmed with redirection, staff support and providing him with a bed lie down in with the lights off to decrease stimuli. He was not violent or self injurious during stay. He later was calm and cooperative.  He was responsive and calm throughout rest of stay.    Vital Signs Last 24 Hrs  T(C): --  T(F): --  HR: 68 (29 May 2018 11:51) (68 - 68)  BP: 91/55 (29 May 2018 11:51) (91/55 - 91/55)  BP(mean): --  RR: 18 (29 May 2018 11:51) (18 - 18)  SpO2: 97% (29 May 2018 11:51) (97% - 97%)

## 2018-05-29 NOTE — ED ADULT NURSE REASSESSMENT NOTE - NS ED NURSE REASSESS COMMENT FT1
Pt became agitated, screaming loudly and not responding to verbal redirection. MD Velasquez aware. Pt was medicated for safety and stabilization(see EMR). Good effects received. Pt awaiting transport  at 14:30. will continue to monitor.

## 2018-05-29 NOTE — ED BEHAVIORAL HEALTH ASSESSMENT NOTE - SUMMARY
Patient is a 62 year old single unemployed non caregiver AAM currently residing in [facility], [place]. PPH Autistic Disorder, Undifferentiated Schizophrenia Conduct Disorder and Profound Intellectual Disability. He has no history of inpatient psychiatric admissions or known suicide attempts. He has a history of SIB,  agitation, property destruction, inappropriate social behavior, aggression/violence and is an elopement risk. Patient has a current behavior plan targeting behaviors and is on a 2:1 at day program where behaviors take place. He has no known substance abuse problems or legal issues. PMH includes HLD. BIBA for agitation.    Patient presented to the ER in the context of agitation. Patient has a history of agitated/aggressive behavior likely related to poor frustration tolerance and limited ability to express his needs/wants verbally which contributes to agitated behavior/frustration. Patient does not appear acutely psychotic or manic. In the ER he was not engaging in violent behavior towards others or self injurious. He was responsive to interventions and then calm throughout rest of stay.     For future management for this Patient, it is important to note that as per studies, "In people with significant developmental delays, agitated and aggressive behavior may be a means of expressing frustration, a learned problem behavior, an expression of physical pain or acute medical problem, a means of communication, or a signal of an acute psychiatric problem (Joni et al., manuscript in preparation; Conley, 2000; Mickie, Eunice, Galina, & Jackson, 1989; Prakash & Deidre, 1997; Martha & Paola, 1986). It is common for persons with mental retardation who have been stable and well adjusted to exhibit regression in situations of stress, pain, changes in routine, or novelty."    Patient's residence have no safety concerns and his behavior at day program is chronic. Patient has chronic risk factors but does not present an imminent risk to self or others meet criteria for involuntary inpatient admission. Extensive safety planning performed. Staff agreeing verbally to return patient to ER or call 911 if symptoms worsen or patient has urges to harm self or others. Patient will follow up tomorrow with his outpatient psychiatrist.    Informed Director Benita of medication received in ER with times, lithium level (recommended repeat with psychiatrist) and appointment time tomorrow. She stated she will call on call MD regarding if medication tonight should be held or given. Patient is a 62 year old single unemployed non caregiver AAM currently residing in [facility]. PPH Autistic Disorder, Undifferentiated Schizophrenia Conduct Disorder and Profound Intellectual Disability. He has no history of inpatient psychiatric admissions or known suicide attempts. He has a history of SIB,  agitation, property destruction, inappropriate social behavior, aggression/violence and is an elopement risk. Patient has a current behavior plan targeting behaviors and is on a 2:1 at day program where behaviors take place. He has no known substance abuse problems or legal issues. PMH includes HLD. BIBA for agitation.    Patient presented to the ER in the context of agitation. Patient has a history of agitated/aggressive behavior likely related to poor frustration tolerance and limited ability to express his needs/wants verbally which contributes to agitated behavior/frustration. Patient does not appear acutely psychotic or manic. In the ER he was not engaging in violent behavior towards others or self injurious. He was responsive to interventions and then calm throughout rest of stay.     For future management for this Patient, it is important to note that as per studies, "In people with significant developmental delays, agitated and aggressive behavior may be a means of expressing frustration, a learned problem behavior, an expression of physical pain or acute medical problem, a means of communication, or a signal of an acute psychiatric problem (Joni et al., manuscript in preparation; Conley, 2000; Mickie, Eunice, Galina, & Jackson, 1989; Prakash & Deidre, 1997; Martha & Paola, 1986). It is common for persons with mental retardation who have been stable and well adjusted to exhibit regression in situations of stress, pain, changes in routine, or novelty."    Patient's residence have no safety concerns and his behavior at day program is chronic. Patient has chronic risk factors but does not present an imminent risk to self or others meet criteria for involuntary inpatient admission. Extensive safety planning performed. Staff agreeing verbally to return patient to ER or call 911 if symptoms worsen or patient has urges to harm self or others. Patient will follow up with his outpatient psychiatrist.

## 2018-05-29 NOTE — ED ADULT TRIAGE NOTE - CHIEF COMPLAINT QUOTE
patient brought in from day rehab with agitation" patient came in handcuffs with PD . patient is screaming and non verbal.

## 2018-05-29 NOTE — ED BEHAVIORAL HEALTH ASSESSMENT NOTE - OTHER PAST PSYCHIATRIC HISTORY (INCLUDE DETAILS REGARDING ONSET, COURSE OF ILLNESS, INPATIENT/OUTPATIENT TREATMENT)
PPH Autistic Disorder, Undifferentiated Schizophrenia Conduct Disorder and Profound Intellectual Disability. He has no history of inpatient psychiatric admissions or known suicide attempts. Psychiatrist, Dr. Sands, at the [facility]

## 2018-05-29 NOTE — ED PROVIDER NOTE - MEDICAL DECISION MAKING DETAILS
62M hx prev. agitation, schizophrenia, MR, agitated PTA and in ED, requiring sedation in ED. Medically cleared for psych eval.

## 2018-05-29 NOTE — ED BEHAVIORAL HEALTH ASSESSMENT NOTE - HPI (INCLUDE ILLNESS QUALITY, SEVERITY, DURATION, TIMING, CONTEXT, MODIFYING FACTORS, ASSOCIATED SIGNS AND SYMPTOMS)
Patient is a 62 year old single unemployed non caregiver AAM currently residing in [facility]. PPH Autistic Disorder, Undifferentiated Schizophrenia Conduct Disorder and Profound Intellectual Disability. He has no history of inpatient psychiatric admissions or known suicide attempts. He has a history of SIB,  agitation, property destruction, inappropriate social behavior,  aggression/violence and is an elopement risk. Patient has a current behavior plan targeting behaviors and is on a 2:1 in day program. He has no known substance abuse problems or legal issues. PMH includes HLD. BIBA for agitation.    Upon arrival patient was agitated. He was yelling, screaming and pacing; he was unable to be de-escalated and required IM Haldol 5mg & Ativan 2mg at 11:20 by Dr. Velasquez. Patient remained agitated and was pacing, attempting to open doors/banging on the window, threw over garbage can, yelling and screaming. Various interventions were attempted by staff but he was unable to be de-escalated and required IM Haldol 5mg and Ativan 2mg IM at 11:50 by Dr. Isaacs. Patient then was able to be calmed with redirection, staff support and providing him with a  bed lie down in with the lights off to decrease stimuli. He was not violent or self injurious during stay. He later was calm and cooperative. He sat in the milieu and calmly at his lunch. Patient became agitated again and threw the trash can. He was yelling and screaming unable to be de-escalated. He was not violent. He required IM medication Thorazine 50mg at 15:30. He was responsive and calm throughout rest of stay.     Patient was unable to be interviewed as he is non-verbal. He has a history of agitated/aggressive/SIB behavior. He was responsive to IM medication and therapeutic intervention.     See  note for collateral information. Patient is a 62 year old single unemployed non caregiver AAM currently residing in [facility]. PPH Autistic Disorder, Undifferentiated Schizophrenia Conduct Disorder and Profound Intellectual Disability. He has no history of inpatient psychiatric admissions or known suicide attempts. He has a history of SIB,  agitation, property destruction, inappropriate social behavior,  aggression/violence and is an elopement risk. Patient has a current behavior plan targeting behaviors and is on a 2:1 in day program. He has no known substance abuse problems or legal issues. PMH includes HLD. BIBA for agitation.  Patient has reportedly been exhibiting agitated behavior at the [facility] day program but has been in good behavioral control at his residence.  Patient has a long history of such behavior which seems to be his baseline and is non-verbal at baseline.    Upon arrival patient was agitated. He was yelling, screaming and pacing; he was unable to be de-escalated and required IM Haldol 5mg & Ativan 2mg at 10:16 by Dr. Velasquez. Patient remained agitated, was yelling and screaming but no physical outbursts exhibited and was provided Chlorpromazine 50mg and Ativan 2mg IM at 10:40am with benefit.  Various interventions were attempted by staff but he was unable to be de-escalated and required IM Haldol 5mg and Ativan 2mg IM at 11:50 by Dr. Isaacs. Patient then was able to be calmed with redirection, staff support and providing him with a  bed lie down in with the lights off to decrease stimuli. He was not violent or self injurious during stay. He later was calm and cooperative. He sat in the milieu and calmly at his lunch. Patient became agitated again and threw the trash can. He was yelling and screaming unable to be de-escalated. He was not violent. He required IM medication Thorazine 50mg at 15:30. He was responsive and calm throughout rest of stay.     Patient was unable to be interviewed as he is non-verbal. He has a history of agitated/aggressive/SIB behavior. He was responsive to IM medication and therapeutic intervention.     See  note for collateral information. Patient is a 62 year old single unemployed non caregiver AAM currently residing in [facility]. PPH Autistic Disorder, Undifferentiated Schizophrenia Conduct Disorder and Profound Intellectual Disability. He has no history of inpatient psychiatric admissions or known suicide attempts. He has a history of SIB,  agitation, property destruction, inappropriate social behavior,  aggression/violence and is an elopement risk. Patient has a current behavior plan targeting behaviors and is on a 2:1 in day program. He has no known substance abuse problems or legal issues. PMH includes HLD. BIBA for agitation.  Patient has reportedly been exhibiting agitated behavior at the [facility] day program but has been in good behavioral control at his residence.  Patient has a long history of such behavior which seems to be his baseline and is non-verbal at baseline.    Upon arrival patient was agitated. He was yelling, screaming and pacing; he was unable to be de-escalated and required IM Haldol 5mg & Ativan 2mg at 10:16 by Dr. Velasquez. Patient remained agitated, was yelling and screaming but no physical outbursts exhibited and was provided Chlorpromazine 50mg and Ativan 2mg IM at 10:40am with benefit.  Various interventions were attempted by staff but he was unable to be de-escalated and required IM medication as stated. Patient then was able to be calmed with redirection, staff support and providing him with a bed to lie down in with the lights off to decrease stimuli. He was not violent or self injurious during stay. He later was calm and cooperative. He sat in the milieu and calmly at his lunch. Patient became agitated again as was yelling and screaming unable to be de-escalated. He was not violent. He required IM medication Thorazine 100mg at 13:28 with benefit.  He was responsive and calm throughout rest of stay.     Patient was unable to be interviewed as he is non-verbal. He has a history of agitated/aggressive/SIB behavior. He was responsive to IM medication and therapeutic intervention.     See  note for collateral information.

## 2018-05-29 NOTE — ED BEHAVIORAL HEALTH ASSESSMENT NOTE - INVOLUNTARY INTRAMUSCULAR MEDICATION DETAILS
Haldol 5mg & Ativan 2mg at 11:20, Haldol 5mg and Ativan 2mg IM at 11:50 by Dr. Isaacs, Thorazine 50mg at 15:30. Haldol 5mg & Ativan 2mg at 10:16, Thorazine 50mg and Ativan 2mg at 10:40 for screaming with benefit. Haldol 5mg & Ativan 2mg at 10:16, Thorazine 50mg and Ativan 2mg at 10:40 for screaming and later Thorazine 100mg IM at 13:28 with benefit.

## 2018-05-29 NOTE — ED BEHAVIORAL HEALTH ASSESSMENT NOTE - OTHER
/Staff informed of medication patient received during ER stay and times and of lithium level. Informed regarding appt time tomorrow with psychiatrist;  will reach out to MD regarding if medication tonight should be given or held. patient is nonverbal; unable to participate in evaluation staff day program peers chronically limited poor frustration tolerance, non verbal lives in 24/7 therapeutic residence /Staff informed of medication patient received during ER stay.   advised to have patient follow up with outpatient psychiatrist prior to resumption of day program.

## 2018-05-29 NOTE — ED BEHAVIORAL HEALTH ASSESSMENT NOTE - REFERRAL / APPOINTMENT DETAILS
Follow up with Dr. Sands 4/6/18 at 12:30PM Follow up with outpatient psychiatrist Dr. Sands within 3 days

## 2018-05-29 NOTE — ED ADULT NURSE NOTE - OBJECTIVE STATEMENT
pt received to BH awake and alert, screaming, aggitated.  pt is non-verbal at baseline, as per staff, pt more aggressive than usual, throwing chairs etc.  pt is ambulatory, pt has been medicated with ativan and haldol per Dr. Eva moraes.

## 2018-05-29 NOTE — ED BEHAVIORAL HEALTH ASSESSMENT NOTE - DETAILS
residence aware; will inform day program patient is non verbal patient non-verbal; history of SIB see hpi/summary/BH note nonverbal at baseline patient is non verbal - medically cleared as per EM provider

## 2018-05-29 NOTE — ED PROVIDER NOTE - OBJECTIVE STATEMENT
62M hx MR, schizophrenia, episodic agitation, was throwing chairs and trying to punch staff PTA, police were called to day program, pt. handcuffed and brought in by EMS with aide who brings old records from 4/18 when had ED eval. here for similar agitation. No reported recent illness, no fever/chills, no falls or head injury. Pt. screaming in ED, intermittently calm, cooperative to exam when seen.

## 2018-05-29 NOTE — ED BEHAVIORAL HEALTH NOTE - BEHAVIORAL HEALTH NOTE
Worker spoke to staff member Rose Bell ([phone]) from [facility] day [facility]. All information is as per Ms. Bell:  Patient is a 62 year old male, autistic, non- verbal, BIBEMS activated by day program [facility] for agitation. Ms. Bell states that the patient has been aggressive towards staff and he has been throwing chairs and tables at the day program. Ms. Bell states that no changes has been made to his medications. Ms. Bell reports that the patient has been throwing himself at the wall. Ms. Bell is unsure what triggers the patient and states this is an ongoing issue.  Worker also spoke to Benita Matta at [facility] ([phone]).  MsDaniel Matta states that the patient has not been aggressive in the residence and once in a while he has an outburst. MsDaniel Matta states that patient has been functioning well at the residence and he does not show his aggressive side when he is home. MsDaniel Matta is unsure if the day program is a trigger for the patient but she states that the behaviors in which the patient presents at the day program is different than how he is at home. MsDaniel Matta states that she will call back with information about staff to accompany patient upon discharge back home. Worker spoke to staff member Rose Ray ([phone]) from [facility] day [facility]. All information is as per Ms. Bell:  Patient is a 62 year old male, autistic, non- verbal, BIBEMS activated by day program [facility] for agitation. Ms. Bell states that the patient has been aggressive towards staff and he has been throwing chairs and tables at the day program. Ms. Bell states that no changes has been made to his medications. Ms. Bell reports that the patient has been throwing himself at the wall. Ms. Bell is unsure what triggers the patient and states this is an ongoing issue.  Worker also spoke to Benita Matta at [facility] ([phone]).  Ms. Sigrid states that the patient has not been aggressive in the residence and once in a while he has an outburst. Ms. Sigrid states that patient has been functioning well at the residence and he does not show his aggressive side when he is home. Ms. Sigrid is unsure if the day program is a trigger for the patient but she states that the behaviors in which the patient presents at the day program is different than how he is at home. Ms. Sigrid states that she will call back with information about staff to accompany patient upon discharge back home.    Ms. Sigrid states that she will send a  to pick patient up with staff member to return to residence eta 2:30pm

## 2019-02-08 NOTE — ED PROVIDER NOTE - NS ED NOTE AC HIGH RISK COUNTRIES
Called patient back, she states that the physician just saw her and they have restarted all of her medications.  She has no concerns or questions at this time  
[facility] Call Center    Phone Message    May a detailed message be left on voicemail: yes    Reason for Call: Other: Pt called in and asked to talk to Dr. Kolb or Stephanie. She said either one would take her call. Marleen is in the hospital at [facility]. Her attending doctor is Dr. Aguiar. She said they are holding her medications, metoprolol, lasix, and losartan and she is not okay with it. Please give a call back to discuss further.     Action Taken: Message routed to:  [facility] ([facility]): Cardiology    
No

## 2019-02-14 ENCOUNTER — EMERGENCY (EMERGENCY)
Facility: HOSPITAL | Age: 64
LOS: 0 days | Discharge: HOME | End: 2019-02-14
Payer: MEDICAID

## 2019-02-14 VITALS
TEMPERATURE: 98 F | DIASTOLIC BLOOD PRESSURE: 74 MMHG | HEART RATE: 91 BPM | SYSTOLIC BLOOD PRESSURE: 126 MMHG | OXYGEN SATURATION: 100 % | RESPIRATION RATE: 19 BRPM

## 2019-02-14 DIAGNOSIS — Z79.899 OTHER LONG TERM (CURRENT) DRUG THERAPY: ICD-10-CM

## 2019-02-14 DIAGNOSIS — F20.9 SCHIZOPHRENIA, UNSPECIFIED: ICD-10-CM

## 2019-02-14 DIAGNOSIS — F84.0 AUTISTIC DISORDER: ICD-10-CM

## 2019-02-14 DIAGNOSIS — F03.90 UNSPECIFIED DEMENTIA WITHOUT BEHAVIORAL DISTURBANCE: ICD-10-CM

## 2019-02-14 DIAGNOSIS — F79 UNSPECIFIED INTELLECTUAL DISABILITIES: ICD-10-CM

## 2019-02-14 DIAGNOSIS — K04.7 PERIAPICAL ABSCESS WITHOUT SINUS: ICD-10-CM

## 2019-02-14 DIAGNOSIS — F29 UNSPECIFIED PSYCHOSIS NOT DUE TO A SUBSTANCE OR KNOWN PHYSIOLOGICAL CONDITION: ICD-10-CM

## 2019-02-14 DIAGNOSIS — R22.0 LOCALIZED SWELLING, MASS AND LUMP, HEAD: ICD-10-CM

## 2019-02-14 PROCEDURE — 99283 EMERGENCY DEPT VISIT LOW MDM: CPT

## 2019-02-14 RX ADMIN — Medication 1 TABLET(S): at 13:33

## 2019-02-14 NOTE — ED PROVIDER NOTE - PHYSICAL EXAMINATION
Gen: Alert, NAD, not toxic  Head: NC, AT, PERRL, EOMI, normal lids/conjunctiva, mild swelling R cheek, no orbital/maxiallry/mandible ttp , no ecchmyosis  ENT: B TM WNL, normal hearing, patent oropharynx without erythema/exudate, uvula midline, poor dentition, multiple missing teeth, R upper molar decayed, no abscess noted  Neck: +supple, no tenderness/meningismus/JVD, +Trachea midline  Pulm: Bilateral BS, normal resp effort, no wheeze/stridor/retractions  CV: RRR, no M/R/G, +dist pulses  Abd: soft, NT/ND, +BS, no hepatosplenomegaly  Mskel: no edema/erythema/cyanosis  Skin: no rash  Neuro: Alert, no sensory/motor deficits, CN 2-12 intact

## 2019-02-14 NOTE — ED ADULT NURSE NOTE - OBJECTIVE STATEMENT
63 y.o male with a hx of MR, schizophrenia was bought in by patient care taker for right side facial swelling. Patient has right upper side rotten tooth. Patient has no grimace or indicator of pain when right side of face is palpated.

## 2019-02-14 NOTE — ED ADULT NURSE NOTE - NSIMPLEMENTINTERV_GEN_ALL_ED
Implemented All Fall Risk Interventions:  [place] to call system. Call bell, personal items and telephone within reach. Instruct patient to call for assistance. Room bathroom lighting operational. Non-slip footwear when patient is off stretcher. Physically safe environment: no spills, clutter or unnecessary equipment. Stretcher in lowest position, wheels locked, appropriate side rails in place. Provide visual cue, wrist band, yellow gown, etc. Monitor gait and stability. Monitor for mental status changes and reorient to person, place, and time. Review medications for side effects contributing to fall risk. Reinforce activity limits and safety measures with patient and family.

## 2019-02-14 NOTE — ED PROVIDER NOTE - CLINICAL SUMMARY MEDICAL DECISION MAKING FREE TEXT BOX
pt here from group home with R cheek swelling, no fall or trauma, pt with poor dentition, +R upper molar decayed, likely from tooth infection, pt given abx and advised to see a dentist, return precautions given

## 2019-02-14 NOTE — ED PROVIDER NOTE - OBJECTIVE STATEMENT
64 y/o male with PMH MR, non-verbal, autism, psychosis, scziophrenia here from group home c/o R cheek swelling x 1 day. as per counselor, noticed it this am. denies any fall injury or trauma. pt is otherwise acting himself. no fevers.

## 2020-01-08 NOTE — ED BEHAVIORAL HEALTH ASSESSMENT NOTE - PAST PSYCHOTROPIC MEDICATION
Medical Necessity Clause: This procedure was medically necessary because the lesions that were treated were: painful, interferes with walking and running Render Note In Bullet Format When Appropriate: No Medical Necessity Information: It is in your best interest to select a reason for this procedure from the list below. All of these items fulfill various CMS LCD requirements except the new and changing color options. Detail Level: Detailed Include Z78.9 (Other Specified Conditions Influencing Health Status) As An Associated Diagnosis?: Yes Consent: The patient's consent was obtained including but not limited to risks of crusting, scabbing, blistering, scarring, darker or lighter pigmentary change, recurrence, incomplete removal and infection. Duration Of Freeze Thaw-Cycle (Seconds): 10-15 Post-Care Instructions: I reviewed with the patient in detail post-care instructions. Patient is to wear sunprotection, and avoid picking at any of the treated lesions. Pt may apply Vaseline to crusted or scabbing areas. Number Of Freeze-Thaw Cycles: 2 freeze-thaw cycles unknown

## 2020-08-17 NOTE — ED ADULT NURSE NOTE - NS TRANSFER PATIENT BELONGINGS
Clothing Cheek To Nose Interpolation Flap Division And Inset Text: Division and inset of the cheek to nose interpolation flap was performed to achieve optimal aesthetic result, restore normal anatomic appearance and avoid distortion of normal anatomy, expedite and facilitate wound healing, achieve optimal functional result and because linear closure either not possible or would produce suboptimal result. The patient was prepped and draped in the usual manner. The pedicle was infiltrated with local anesthesia. The pedicle was sectioned with a #15 blade. The pedicle was de-bulked and trimmed to match the shape of the defect. Hemostasis was achieved. The flap donor site and free margin of the flap were secured with deep buried sutures and the wound edges were re-approximated.

## 2021-12-13 NOTE — ED ADULT NURSE REASSESSMENT NOTE - CONDITION
improved Double O-Z Plasty Text: The defect edges were debeveled with a #15 scalpel blade.  Given the location of the defect, shape of the defect and the proximity to free margins a Double O-Z plasty (double transposition flap) was deemed most appropriate.  Using a sterile surgical marker, the appropriate transposition flaps were drawn incorporating the defect and placing the expected incisions within the relaxed skin tension lines where possible. The area thus outlined was incised deep to adipose tissue with a #15 scalpel blade.  The skin margins were undermined to an appropriate distance in all directions utilizing iris scissors.  Hemostasis was achieved with electrocautery.  The flaps were then transposed into place, one clockwise and the other counterclockwise, and anchored with interrupted buried subcutaneous sutures.

## 2022-04-20 RX ORDER — CLONAZEPAM 1 MG
1 TABLET ORAL
Qty: 0 | Refills: 0 | DISCHARGE

## 2022-04-20 RX ORDER — PINDOLOL 10 MG
2 TABLET ORAL
Qty: 0 | Refills: 0 | DISCHARGE

## 2022-04-20 RX ORDER — OLANZAPINE 15 MG/1
1 TABLET, FILM COATED ORAL
Qty: 0 | Refills: 0 | DISCHARGE

## 2022-04-20 RX ORDER — LANOLIN/MINERAL OIL
1 LOTION (ML) TOPICAL
Qty: 0 | Refills: 0 | DISCHARGE

## 2022-04-20 RX ORDER — LEVOTHYROXINE SODIUM 125 MCG
0 TABLET ORAL
Qty: 0 | Refills: 0 | DISCHARGE

## 2022-04-20 RX ORDER — LITHIUM CARBONATE 300 MG/1
600 TABLET, EXTENDED RELEASE ORAL
Qty: 0 | Refills: 0 | DISCHARGE

## 2022-04-20 RX ORDER — CHOLECALCIFEROL (VITAMIN D3) 125 MCG
1 CAPSULE ORAL
Qty: 0 | Refills: 0 | DISCHARGE

## 2022-04-20 RX ORDER — LACTULOSE 10 G/15ML
1 SOLUTION ORAL
Qty: 0 | Refills: 0 | DISCHARGE

## 2022-04-20 RX ORDER — MOXIFLOXACIN HYDROCHLORIDE TABLETS, 400 MG 400 MG/1
1 TABLET, FILM COATED ORAL
Qty: 0 | Refills: 0 | DISCHARGE

## 2024-02-26 NOTE — ED ADULT TRIAGE NOTE - CADM TRG TX PRIOR TO ARRIVAL
----- Message from Jennifer B. Scheuermann, PA sent at 2/26/2024  1:05 PM CST -----  Labs all reviewed - stable. Looks like u/s scheduled but still needs fibroscan and f/u visit set up.  (I think she maybe wanted fibroscan in [place])    Thanks!  
I spoke with patient and msg from PA Scheuermann relayed.  Fibroscan should be scheduled soon.  F/u visit scheduled 3/26/24; appt reminder notice mailed.  
see ambulance record

## 2024-09-04 NOTE — ED PROVIDER NOTE - NS ED MD EM SELECTION
Repeat urine with no infection. Make patient aware  Forward to surgeon, make sure preoperative clearance sent 
54672 Exp Problem Focused - Mod. Complex

## 2025-01-18 ENCOUNTER — EMERGENCY (EMERGENCY)
Facility: HOSPITAL | Age: 70
LOS: 0 days | Discharge: HOME | End: 2025-01-19
Attending: EMERGENCY MEDICINE
Payer: MEDICARE

## 2025-01-18 VITALS
OXYGEN SATURATION: 95 % | HEIGHT: 66 IN | WEIGHT: 199.96 LBS | HEART RATE: 97 BPM | RESPIRATION RATE: 20 BRPM | SYSTOLIC BLOOD PRESSURE: 112 MMHG | DIASTOLIC BLOOD PRESSURE: 80 MMHG | TEMPERATURE: 99 F

## 2025-01-18 DIAGNOSIS — F29 UNSPECIFIED PSYCHOSIS NOT DUE TO A SUBSTANCE OR KNOWN PHYSIOLOGICAL CONDITION: ICD-10-CM

## 2025-01-18 DIAGNOSIS — F41.1 GENERALIZED ANXIETY DISORDER: ICD-10-CM

## 2025-01-18 DIAGNOSIS — R11.2 NAUSEA WITH VOMITING, UNSPECIFIED: ICD-10-CM

## 2025-01-18 DIAGNOSIS — Z87.19 PERSONAL HISTORY OF OTHER DISEASES OF THE DIGESTIVE SYSTEM: ICD-10-CM

## 2025-01-18 DIAGNOSIS — F91.9 CONDUCT DISORDER, UNSPECIFIED: ICD-10-CM

## 2025-01-18 DIAGNOSIS — F84.0 AUTISTIC DISORDER: ICD-10-CM

## 2025-01-18 DIAGNOSIS — H26.9 UNSPECIFIED CATARACT: ICD-10-CM

## 2025-01-18 DIAGNOSIS — R50.9 FEVER, UNSPECIFIED: ICD-10-CM

## 2025-01-18 DIAGNOSIS — F72 SEVERE INTELLECTUAL DISABILITIES: ICD-10-CM

## 2025-01-18 DIAGNOSIS — B34.9 VIRAL INFECTION, UNSPECIFIED: ICD-10-CM

## 2025-01-18 DIAGNOSIS — Z87.2 PERSONAL HISTORY OF DISEASES OF THE SKIN AND SUBCUTANEOUS TISSUE: ICD-10-CM

## 2025-01-18 DIAGNOSIS — E78.5 HYPERLIPIDEMIA, UNSPECIFIED: ICD-10-CM

## 2025-01-18 DIAGNOSIS — F25.9 SCHIZOAFFECTIVE DISORDER, UNSPECIFIED: ICD-10-CM

## 2025-01-18 PROCEDURE — 99285 EMERGENCY DEPT VISIT HI MDM: CPT

## 2025-01-19 VITALS
DIASTOLIC BLOOD PRESSURE: 79 MMHG | RESPIRATION RATE: 19 BRPM | OXYGEN SATURATION: 99 % | TEMPERATURE: 98 F | SYSTOLIC BLOOD PRESSURE: 127 MMHG | HEART RATE: 84 BPM

## 2025-01-19 PROBLEM — F79 UNSPECIFIED INTELLECTUAL DISABILITIES: Chronic | Status: ACTIVE | Noted: 2018-04-14

## 2025-01-19 LAB
ALBUMIN SERPL ELPH-MCNC: 3.6 G/DL — SIGNIFICANT CHANGE UP (ref 3.3–5)
ALP SERPL-CCNC: 97 U/L — SIGNIFICANT CHANGE UP (ref 40–120)
ALT FLD-CCNC: 53 U/L — SIGNIFICANT CHANGE UP (ref 12–78)
ANION GAP SERPL CALC-SCNC: 7 MMOL/L — SIGNIFICANT CHANGE UP (ref 5–17)
AST SERPL-CCNC: 61 U/L — HIGH (ref 15–37)
BASOPHILS # BLD AUTO: 0.01 K/UL — SIGNIFICANT CHANGE UP (ref 0–0.2)
BASOPHILS NFR BLD AUTO: 0.2 % — SIGNIFICANT CHANGE UP (ref 0–2)
BILIRUB SERPL-MCNC: 0.6 MG/DL — SIGNIFICANT CHANGE UP (ref 0.2–1.2)
BLD GP AB SCN SERPL QL: SIGNIFICANT CHANGE UP
BUN SERPL-MCNC: 21 MG/DL — SIGNIFICANT CHANGE UP (ref 7–23)
CALCIUM SERPL-MCNC: 8.2 MG/DL — LOW (ref 8.5–10.1)
CHLORIDE SERPL-SCNC: 106 MMOL/L — SIGNIFICANT CHANGE UP (ref 96–108)
CO2 SERPL-SCNC: 25 MMOL/L — SIGNIFICANT CHANGE UP (ref 22–31)
CREAT SERPL-MCNC: 1.25 MG/DL — SIGNIFICANT CHANGE UP (ref 0.5–1.3)
EGFR: 63 ML/MIN/1.73M2 — SIGNIFICANT CHANGE UP
EGFR: 63 ML/MIN/1.73M2 — SIGNIFICANT CHANGE UP
EOSINOPHIL # BLD AUTO: 0.1 K/UL — SIGNIFICANT CHANGE UP (ref 0–0.5)
EOSINOPHIL NFR BLD AUTO: 2.2 % — SIGNIFICANT CHANGE UP (ref 0–6)
FLUAV AG NPH QL: DETECTED
FLUBV AG NPH QL: SIGNIFICANT CHANGE UP
GLUCOSE SERPL-MCNC: 104 MG/DL — HIGH (ref 70–99)
HCT VFR BLD CALC: 37.6 % — LOW (ref 39–50)
HGB BLD-MCNC: 12.3 G/DL — LOW (ref 13–17)
IMM GRANULOCYTES NFR BLD AUTO: 0.2 % — SIGNIFICANT CHANGE UP (ref 0–0.9)
LACTATE SERPL-SCNC: 0.9 MMOL/L — SIGNIFICANT CHANGE UP (ref 0.7–2)
LIDOCAIN IGE QN: 18 U/L — SIGNIFICANT CHANGE UP (ref 13–75)
LYMPHOCYTES # BLD AUTO: 0.74 K/UL — LOW (ref 1–3.3)
LYMPHOCYTES # BLD AUTO: 16 % — SIGNIFICANT CHANGE UP (ref 13–44)
MCHC RBC-ENTMCNC: 28.5 PG — SIGNIFICANT CHANGE UP (ref 27–34)
MCHC RBC-ENTMCNC: 32.7 G/DL — SIGNIFICANT CHANGE UP (ref 32–36)
MCV RBC AUTO: 87.2 FL — SIGNIFICANT CHANGE UP (ref 80–100)
MONOCYTES # BLD AUTO: 0.96 K/UL — HIGH (ref 0–0.9)
MONOCYTES NFR BLD AUTO: 20.8 % — HIGH (ref 2–14)
NEUTROPHILS # BLD AUTO: 2.8 K/UL — SIGNIFICANT CHANGE UP (ref 1.8–7.4)
NEUTROPHILS NFR BLD AUTO: 60.6 % — SIGNIFICANT CHANGE UP (ref 43–77)
NRBC # BLD: 0 /100 WBCS — SIGNIFICANT CHANGE UP (ref 0–0)
NRBC BLD-RTO: 0 /100 WBCS — SIGNIFICANT CHANGE UP (ref 0–0)
PLATELET # BLD AUTO: 117 K/UL — LOW (ref 150–400)
POTASSIUM SERPL-MCNC: 4.1 MMOL/L — SIGNIFICANT CHANGE UP (ref 3.5–5.3)
POTASSIUM SERPL-SCNC: 4.1 MMOL/L — SIGNIFICANT CHANGE UP (ref 3.5–5.3)
PROT SERPL-MCNC: 7.3 GM/DL — SIGNIFICANT CHANGE UP (ref 6–8.3)
RBC # BLD: 4.31 M/UL — SIGNIFICANT CHANGE UP (ref 4.2–5.8)
RBC # FLD: 14.7 % — HIGH (ref 10.3–14.5)
RSV RNA NPH QL NAA+NON-PROBE: SIGNIFICANT CHANGE UP
SARS-COV-2 RNA SPEC QL NAA+PROBE: SIGNIFICANT CHANGE UP
SODIUM SERPL-SCNC: 138 MMOL/L — SIGNIFICANT CHANGE UP (ref 135–145)
TROPONIN I, HIGH SENSITIVITY RESULT: 10.8 NG/L — SIGNIFICANT CHANGE UP
WBC # BLD: 4.62 K/UL — SIGNIFICANT CHANGE UP (ref 3.8–10.5)
WBC # FLD AUTO: 4.62 K/UL — SIGNIFICANT CHANGE UP (ref 3.8–10.5)

## 2025-01-19 PROCEDURE — 74177 CT ABD & PELVIS W/CONTRAST: CPT | Mod: 26

## 2025-01-19 PROCEDURE — 71260 CT THORAX DX C+: CPT | Mod: 26

## 2025-01-19 PROCEDURE — 71045 X-RAY EXAM CHEST 1 VIEW: CPT | Mod: 26

## 2025-01-19 PROCEDURE — 93010 ELECTROCARDIOGRAM REPORT: CPT

## 2025-01-19 RX ORDER — IPRATROPIUM BROMIDE AND ALBUTEROL SULFATE .5; 2.5 MG/3ML; MG/3ML
3 SOLUTION RESPIRATORY (INHALATION)
Refills: 0 | Status: COMPLETED | OUTPATIENT
Start: 2025-01-19 | End: 2025-01-19

## 2025-01-19 RX ORDER — ACETAMINOPHEN 500 MG/5ML
975 LIQUID (ML) ORAL ONCE
Refills: 0 | Status: COMPLETED | OUTPATIENT
Start: 2025-01-19 | End: 2025-01-19

## 2025-01-19 RX ORDER — METOCLOPRAMIDE HCL 10 MG
10 TABLET ORAL ONCE
Refills: 0 | Status: COMPLETED | OUTPATIENT
Start: 2025-01-19 | End: 2025-01-19

## 2025-01-19 RX ORDER — IOHEXOL 350 MG/ML
30 INJECTION, SOLUTION INTRAVENOUS ONCE
Refills: 0 | Status: COMPLETED | OUTPATIENT
Start: 2025-01-19 | End: 2025-01-19

## 2025-01-19 RX ORDER — METHYLPREDNISOLONE ACETATE 80 MG/ML
125 INJECTION, SUSPENSION INTRA-ARTICULAR; INTRALESIONAL; INTRAMUSCULAR; SOFT TISSUE ONCE
Refills: 0 | Status: COMPLETED | OUTPATIENT
Start: 2025-01-19 | End: 2025-01-19

## 2025-01-19 RX ADMIN — Medication 975 MILLIGRAM(S): at 04:12

## 2025-01-19 RX ADMIN — IPRATROPIUM BROMIDE AND ALBUTEROL SULFATE 3 MILLILITER(S): .5; 2.5 SOLUTION RESPIRATORY (INHALATION) at 05:34

## 2025-01-19 RX ADMIN — IOHEXOL 30 MILLILITER(S): 350 INJECTION, SOLUTION INTRAVENOUS at 04:14

## 2025-01-19 RX ADMIN — Medication 10 MILLIGRAM(S): at 04:54

## 2025-01-19 RX ADMIN — METHYLPREDNISOLONE ACETATE 125 MILLIGRAM(S): 80 INJECTION, SUSPENSION INTRA-ARTICULAR; INTRALESIONAL; INTRAMUSCULAR; SOFT TISSUE at 04:54

## 2025-01-19 RX ADMIN — Medication 1000 MILLILITER(S): at 04:12

## 2025-01-19 RX ADMIN — IPRATROPIUM BROMIDE AND ALBUTEROL SULFATE 3 MILLILITER(S): .5; 2.5 SOLUTION RESPIRATORY (INHALATION) at 05:11

## 2025-01-19 RX ADMIN — Medication 20 MILLIGRAM(S): at 04:54

## 2025-01-19 RX ADMIN — IPRATROPIUM BROMIDE AND ALBUTEROL SULFATE 3 MILLILITER(S): .5; 2.5 SOLUTION RESPIRATORY (INHALATION) at 04:54

## 2025-01-22 ENCOUNTER — EMERGENCY (EMERGENCY)
Facility: HOSPITAL | Age: 70
LOS: 0 days | Discharge: HOME | End: 2025-01-22
Attending: STUDENT IN AN ORGANIZED HEALTH CARE EDUCATION/TRAINING PROGRAM
Payer: MEDICARE

## 2025-01-22 VITALS
RESPIRATION RATE: 20 BRPM | OXYGEN SATURATION: 97 % | HEART RATE: 76 BPM | TEMPERATURE: 99 F | SYSTOLIC BLOOD PRESSURE: 130 MMHG | DIASTOLIC BLOOD PRESSURE: 81 MMHG

## 2025-01-22 VITALS
TEMPERATURE: 99 F | SYSTOLIC BLOOD PRESSURE: 137 MMHG | HEART RATE: 82 BPM | HEIGHT: 66 IN | RESPIRATION RATE: 18 BRPM | DIASTOLIC BLOOD PRESSURE: 77 MMHG | OXYGEN SATURATION: 95 %

## 2025-01-22 DIAGNOSIS — E78.5 HYPERLIPIDEMIA, UNSPECIFIED: ICD-10-CM

## 2025-01-22 DIAGNOSIS — F20.9 SCHIZOPHRENIA, UNSPECIFIED: ICD-10-CM

## 2025-01-22 DIAGNOSIS — R79.89 OTHER SPECIFIED ABNORMAL FINDINGS OF BLOOD CHEMISTRY: ICD-10-CM

## 2025-01-22 DIAGNOSIS — F84.0 AUTISTIC DISORDER: ICD-10-CM

## 2025-01-22 DIAGNOSIS — R06.2 WHEEZING: ICD-10-CM

## 2025-01-22 DIAGNOSIS — J10.1 INFLUENZA DUE TO OTHER IDENTIFIED INFLUENZA VIRUS WITH OTHER RESPIRATORY MANIFESTATIONS: ICD-10-CM

## 2025-01-22 DIAGNOSIS — R05.1 ACUTE COUGH: ICD-10-CM

## 2025-01-22 DIAGNOSIS — F79 UNSPECIFIED INTELLECTUAL DISABILITIES: ICD-10-CM

## 2025-01-22 LAB
ANION GAP SERPL CALC-SCNC: 4 MMOL/L — LOW (ref 5–17)
BUN SERPL-MCNC: 18 MG/DL — SIGNIFICANT CHANGE UP (ref 7–23)
CALCIUM SERPL-MCNC: 8.4 MG/DL — LOW (ref 8.5–10.1)
CHLORIDE SERPL-SCNC: 108 MMOL/L — SIGNIFICANT CHANGE UP (ref 96–108)
CO2 SERPL-SCNC: 29 MMOL/L — SIGNIFICANT CHANGE UP (ref 22–31)
CREAT SERPL-MCNC: 1.16 MG/DL — SIGNIFICANT CHANGE UP (ref 0.5–1.3)
EGFR: 69 ML/MIN/1.73M2 — SIGNIFICANT CHANGE UP
GLUCOSE SERPL-MCNC: 118 MG/DL — HIGH (ref 70–99)
HCT VFR BLD CALC: 38.1 % — LOW (ref 39–50)
HGB BLD-MCNC: 12.4 G/DL — LOW (ref 13–17)
MCHC RBC-ENTMCNC: 28.4 PG — SIGNIFICANT CHANGE UP (ref 27–34)
MCHC RBC-ENTMCNC: 32.5 G/DL — SIGNIFICANT CHANGE UP (ref 32–36)
MCV RBC AUTO: 87.4 FL — SIGNIFICANT CHANGE UP (ref 80–100)
NRBC # BLD: 0 /100 WBCS — SIGNIFICANT CHANGE UP (ref 0–0)
PLATELET # BLD AUTO: 122 K/UL — LOW (ref 150–400)
POTASSIUM SERPL-MCNC: 4.2 MMOL/L — SIGNIFICANT CHANGE UP (ref 3.5–5.3)
POTASSIUM SERPL-SCNC: 4.2 MMOL/L — SIGNIFICANT CHANGE UP (ref 3.5–5.3)
RBC # BLD: 4.36 M/UL — SIGNIFICANT CHANGE UP (ref 4.2–5.8)
RBC # FLD: 14.7 % — HIGH (ref 10.3–14.5)
SODIUM SERPL-SCNC: 141 MMOL/L — SIGNIFICANT CHANGE UP (ref 135–145)
WBC # BLD: 3.01 K/UL — LOW (ref 3.8–10.5)
WBC # FLD AUTO: 3.01 K/UL — LOW (ref 3.8–10.5)

## 2025-01-22 PROCEDURE — 99284 EMERGENCY DEPT VISIT MOD MDM: CPT

## 2025-01-22 PROCEDURE — 71045 X-RAY EXAM CHEST 1 VIEW: CPT | Mod: 26

## 2025-01-22 RX ORDER — IPRATROPIUM BROMIDE AND ALBUTEROL SULFATE .5; 2.5 MG/3ML; MG/3ML
3 SOLUTION RESPIRATORY (INHALATION) ONCE
Refills: 0 | Status: COMPLETED | OUTPATIENT
Start: 2025-01-22 | End: 2025-01-22

## 2025-01-22 RX ORDER — ALBUTEROL SULFATE 90 UG/1
2 INHALANT RESPIRATORY (INHALATION)
Qty: 1 | Refills: 0
Start: 2025-01-22 | End: 2025-01-26

## 2025-01-22 RX ORDER — DEXAMETHASONE SODIUM PHOSPHATE 4 MG/ML
10 VIAL (ML) INJECTION ONCE
Refills: 0 | Status: COMPLETED | OUTPATIENT
Start: 2025-01-22 | End: 2025-01-22

## 2025-01-22 RX ADMIN — Medication 102 MILLIGRAM(S): at 15:45

## 2025-01-22 RX ADMIN — IPRATROPIUM BROMIDE AND ALBUTEROL SULFATE 3 MILLILITER(S): .5; 2.5 SOLUTION RESPIRATORY (INHALATION) at 14:30

## 2025-01-22 NOTE — ED ADULT NURSE NOTE - RESPIRATORY ASSESSMENT
Patient being discharged home  Patient demonstrates improvement upon d/c. Pt verbalized understanding of d/c instructions. AVS provided.    Pt discharged home with girlfriend and family - - -

## 2025-01-22 NOTE — ED PROVIDER NOTE - CLINICAL SUMMARY MEDICAL DECISION MAKING FREE TEXT BOX
This patient is a 69-year-old male presenting to the emergency department today for flulike symptoms.  He was given a breathing treatment due to the transmitted upper respiratory noises which may mask underlying wheezing on auscultation.    BMP shows no significant electrolyte abnormalities.  No DAVID.  Elevated LFTs.  Chest x-ray shows no evidence of pneumonia no pneumothoraces per my independent evaluation.    This patient was positive for influenza A on his last visit roughly 3 days ago.  He had no leukocytosis at the time.    He was given Decadron to promote reduction in airway inflammation.  He is well-appearing nontoxic at this time.  No longer meeting and expiratory wheezing noises.  She resting comfortably in bed.  At this time, the patient's CBC has not resulted.  For this reason, the patient will be signed out to the oncoming physician.  Please see the oncoming physician's addendum's, notes, and progress notes for any change in this patient's management or care.

## 2025-01-22 NOTE — ED PROVIDER NOTE - PATIENT PORTAL LINK FT
You can access the FollowMyHealth Patient Portal offered by [facility] by registering at the following website: [email or web address]. By joining [facility] FollowMyHealth portal, you will also be able to view your health information using other applications (apps) compatible with our system.

## 2025-01-22 NOTE — ED ADULT NURSE NOTE - BIRTH SEX
change of breathing pattern/cough/gurgly voice/fever/pneumonia/throat clearing/upper respiratory infection
Monitor for s/s aspiration/laryngeal penetration. If noted:  D/C p.o. intake, provide non-oral nutrition/hydration/meds, and contact this service @ [phone]/change of breathing pattern/cough/gurgly voice/fever/pneumonia/throat clearing/upper respiratory infection
Male

## 2025-01-22 NOTE — ED ADULT NURSE NOTE - NSICDXPASTMEDICALHX_GEN_ALL_CORE_FT
PAST MEDICAL HISTORY:  Agitation     Autism     Hyperlipidemia     MR (mental retardation)     MR (mental retardation)     Mutism     Schizophrenia

## 2025-01-22 NOTE — ED PROVIDER NOTE - UNABLE TO OBTAIN
Dementia CONSTITUTIONAL: Review of systems cannot be  completed as the patient is nonverbal at baseline.  Acute care caveat will take place.

## 2025-01-22 NOTE — ED PROVIDER NOTE - NSICDXPASTMEDICALHX_GEN_ALL_CORE_FT
PAST MEDICAL HISTORY:  Agitation     Autism     Hyperlipidemia     MR (mental retardation)     MR (mental retardation)     Mutism     Schizophrenia      Denies

## 2025-01-22 NOTE — ED ADULT NURSE NOTE - NSFALLHARMRISKINTERV_ED_ALL_ED

## 2025-01-22 NOTE — ED PROVIDER NOTE - OBJECTIVE STATEMENT
This patient is a 69-year-old male presenting to the emergency department today for cough and a wheezing.  He has a past medical history of autism, hyperlipidemia, mental retardation, and schizophrenia.  He is nonverbal at baseline.  History is provided by the patient's aide who is at bedside.  States that the patient has had cough for 4 days.  The wheezing started today.  They went to the urgent care and was advised to come to the emergency department.  No further history can be provided at this time

## 2025-01-22 NOTE — ED ADULT TRIAGE NOTE - CHIEF COMPLAINT QUOTE
sent from urgent care for cough, congestion, shortness of breath and  wheezing for 4 days. pt is non verbal. as per group aide, no fever . history of intellectually disability, schizophrenia and anxiety.

## 2025-01-22 NOTE — ED ADULT NURSE NOTE - OBJECTIVE STATEMENT
Patient non-verbal sent to [facility] from urgent care for increased work of breathing, wheezing and SOB with exertion. Bilateral auditory wheeze auscultated to both lungs. Stat Duoneb initiated, stat IVL obtained to right lower arm., pt place on cardiac monitor sating 98% on aerosol mask.

## 2025-01-24 LAB
CULTURE RESULTS: SIGNIFICANT CHANGE UP
CULTURE RESULTS: SIGNIFICANT CHANGE UP
SPECIMEN SOURCE: SIGNIFICANT CHANGE UP
SPECIMEN SOURCE: SIGNIFICANT CHANGE UP